# Patient Record
Sex: FEMALE | Race: WHITE | NOT HISPANIC OR LATINO | Employment: OTHER | URBAN - METROPOLITAN AREA
[De-identification: names, ages, dates, MRNs, and addresses within clinical notes are randomized per-mention and may not be internally consistent; named-entity substitution may affect disease eponyms.]

---

## 2017-01-05 ASSESSMENT — MIFFLIN-ST. JEOR: SCORE: 1681.57

## 2017-01-06 ENCOUNTER — SURGERY - HEALTHEAST (OUTPATIENT)
Dept: SURGERY | Facility: HOSPITAL | Age: 52
End: 2017-01-06

## 2017-01-06 ENCOUNTER — ANESTHESIA - HEALTHEAST (OUTPATIENT)
Dept: SURGERY | Facility: HOSPITAL | Age: 52
End: 2017-01-06

## 2021-05-30 VITALS — BODY MASS INDEX: 39.99 KG/M2 | HEIGHT: 65 IN | WEIGHT: 240 LBS

## 2021-06-08 NOTE — ANESTHESIA PREPROCEDURE EVALUATION
"Anesthesia Evaluation      Patient summary reviewed     Airway   Mallampati: II  Neck ROM: full   Pulmonary - negative ROS    breath sounds clear to auscultation                         Cardiovascular   Exercise tolerance: > or = 4 METS  (+) hypertension, CAD, , hypercholesterolemia,     Rhythm: regular        Neuro/Psych    (+) CVA ,     Endo/Other    (+) obesity,      GI/Hepatic/Renal    (+) GERD,        Other findings: Hx of MI right after delivering child. ECHO done in 2012 with [facility] was \"normal\" according to patient and . Seen by cardiology within the last month and cleared.     Hx of multiple DVTs and CVAs - dysarthria and some distal motor deficits in LUE. No sensory deficits per patient.    Ambulates without assistance devices.    Last plavix was in November 2016.            Dental      Comment: Temporary crown on tooth #30                       Anesthesia Plan  Planned anesthetic: spinal and MAC  SAB with GETA discussed with patient and family and they agree to the plan.  ASA 3       Anesthesia plan special considerations: antiemetics, IV therapy two IVs,   Post-op plan: routine recovery            Salma Allen MD  Staff Anesthesiologist  [facility]  [facility]  1/6/17          "

## 2021-06-08 NOTE — ANESTHESIA CARE TRANSFER NOTE
Last vitals:   Vitals:    01/06/17 1350   BP: 116/78   Pulse: 84   Resp: 24   Temp: 36.6  C (97.8  F)   SpO2: 98%     Patient's level of consciousness is drowsy  Spontaneous respirations: yes  Maintains airway independently: yes  Dentition unchanged: yes  Oropharynx: oropharynx clear of all foreign objects    QCDR Measures:  ASA# 20 - Surgical Safety Checklist: ASA20A - Safety Checks Done  PQRS# 430 - Adult PONV Prevention: 4558F - Pt received => 2 anti-emetic agents (different classes) preop & intraop  ASA# 8 - Peds PONV Prevention: NA - Not pediatric patient, not GA or 2 or more risk factors NOT present  PQRS# 424 - Madiha-op Temp Management: 4559F - At least one body temp DOCUMENTED => 35.5C or 95.9F within required timeframe  PQRS# 426 - PACU Transfer Protocol: - Transfer of care checklist used  ASA# 14 - Acute Post-op Pain: ASA14B - Patient did NOT experience pain >= 7 out of 10    I completed my SBAR handoff to the receiving nurse per policy and procedure.

## 2021-06-08 NOTE — ANESTHESIA POSTPROCEDURE EVALUATION
Patient: Radha Snyder  VAGINAL HYSTERECTOMY, ANTERIOR REPAIR  Anesthesia type: spinal    Patient location: PACU  Last vitals:   Vitals:    01/06/17 1430   BP: 144/81   Pulse: 69   Resp: 17   Temp: 36.4  C (97.5  F)   SpO2: 97%     Post vital signs: stable  Level of consciousness: awake and responds to simple questions  Post-anesthesia pain: pain controlled  Post-anesthesia nausea and vomiting: no  Pulmonary: unassisted, return to baseline  Cardiovascular: stable and blood pressure at baseline  Hydration: adequate  Anesthetic events: no    QCDR Measures:  ASA# 11 - Madiha-op Cardiac Arrest: ASA11B - Patient did NOT experience unanticipated cardiac arrest  ASA# 12 - Madiha-op Mortality Rate: ASA12B - Patient did NOT die  ASA# 13 - PACU Re-Intubation Rate: ASA13B - Patient did NOT require a new airway mgmt  ASA# 10 - Composite Anes Safety: ASA10A - No serious adverse event  ASA# 38 - New Corneal Injury: ASA38A - No new exposure keratitis or corneal abrasion in PACU    Additional Notes:

## 2021-06-08 NOTE — ANESTHESIA PROCEDURE NOTES
Spinal Block    Patient location during procedure: OR  Start time: 1/6/2017 12:01 PM  End time: 1/6/2017 12:07 PM  Reason for block: at surgeon's request and primary anesthetic    Staffing:  Performing  Anesthesiologist: SALMA ALLEN    Preanesthetic Checklist  Completed: patient identified, risks, benefits, and alternatives discussed, timeout performed, consent obtained, airway assessed, oxygen available, suction available, emergency drugs available and hand hygiene performed  Spinal Block  Patient position: sitting  Prep: ChloraPrep  Patient monitoring: heart rate, continuous pulse ox and blood pressure  Approach: midline  Location: L2-3  Injection technique: single-shot  Needle type: pencil-tip   Needle gauge: 24 G      Additional Notes:    SAB with marcaine 0.75% 1.8 cc.    Salma Allen MD  Staff Anesthesiologist  [facility]  [facility]  1/6/17

## 2023-02-07 ENCOUNTER — HOSPITAL ENCOUNTER (OUTPATIENT)
Facility: CLINIC | Age: 58
Discharge: HOME | End: 2023-02-07
Attending: COLON & RECTAL SURGERY | Admitting: COLON & RECTAL SURGERY
Payer: COMMERCIAL

## 2023-02-07 ENCOUNTER — ANESTHESIA EVENT (OUTPATIENT)
Dept: SURGERY | Facility: CLINIC | Age: 58
DRG: 330 | End: 2023-02-07
Payer: COMMERCIAL

## 2023-02-07 VITALS
SYSTOLIC BLOOD PRESSURE: 113 MMHG | RESPIRATION RATE: 16 BRPM | DIASTOLIC BLOOD PRESSURE: 88 MMHG | OXYGEN SATURATION: 100 % | HEIGHT: 65 IN | BODY MASS INDEX: 39.99 KG/M2 | WEIGHT: 240 LBS | HEART RATE: 60 BPM

## 2023-02-07 LAB — COLONOSCOPY: NORMAL

## 2023-02-07 PROCEDURE — 99153 MOD SED SAME PHYS/QHP EA: CPT | Mod: PT | Performed by: COLON & RECTAL SURGERY

## 2023-02-07 PROCEDURE — 250N000011 HC RX IP 250 OP 636: Performed by: COLON & RECTAL SURGERY

## 2023-02-07 PROCEDURE — 999N000127 HC STATISTIC PERIPHERAL IV START W US GUIDANCE

## 2023-02-07 PROCEDURE — 88305 TISSUE EXAM BY PATHOLOGIST: CPT | Mod: TC | Performed by: COLON & RECTAL SURGERY

## 2023-02-07 PROCEDURE — G0500 MOD SEDAT ENDO SERVICE >5YRS: HCPCS | Mod: PT | Performed by: COLON & RECTAL SURGERY

## 2023-02-07 PROCEDURE — 88305 TISSUE EXAM BY PATHOLOGIST: CPT | Mod: 26 | Performed by: PATHOLOGY

## 2023-02-07 PROCEDURE — 45380 COLONOSCOPY AND BIOPSY: CPT | Performed by: COLON & RECTAL SURGERY

## 2023-02-07 RX ORDER — PROCHLORPERAZINE MALEATE 10 MG
10 TABLET ORAL EVERY 6 HOURS PRN
Status: DISCONTINUED | OUTPATIENT
Start: 2023-02-07 | End: 2023-02-07 | Stop reason: HOSPADM

## 2023-02-07 RX ORDER — CEFAZOLIN SODIUM 2 G/100ML
2 INJECTION, SOLUTION INTRAVENOUS SEE ADMIN INSTRUCTIONS
Status: CANCELLED | OUTPATIENT
Start: 2023-02-07

## 2023-02-07 RX ORDER — LIDOCAINE 40 MG/G
CREAM TOPICAL
Status: DISCONTINUED | OUTPATIENT
Start: 2023-02-07 | End: 2023-02-07 | Stop reason: HOSPADM

## 2023-02-07 RX ORDER — ACETAMINOPHEN 325 MG/1
975 TABLET ORAL ONCE
Status: CANCELLED | OUTPATIENT
Start: 2023-02-07 | End: 2023-02-07

## 2023-02-07 RX ORDER — METRONIDAZOLE 500 MG/1
500 TABLET ORAL SEE ADMIN INSTRUCTIONS
Status: ON HOLD | COMMUNITY
End: 2023-02-09

## 2023-02-07 RX ORDER — FLUMAZENIL 0.1 MG/ML
0.2 INJECTION, SOLUTION INTRAVENOUS
Status: DISCONTINUED | OUTPATIENT
Start: 2023-02-07 | End: 2023-02-07 | Stop reason: HOSPADM

## 2023-02-07 RX ORDER — NALOXONE HYDROCHLORIDE 0.4 MG/ML
0.4 INJECTION, SOLUTION INTRAMUSCULAR; INTRAVENOUS; SUBCUTANEOUS
Status: DISCONTINUED | OUTPATIENT
Start: 2023-02-07 | End: 2023-02-07 | Stop reason: HOSPADM

## 2023-02-07 RX ORDER — ATROPINE SULFATE 0.1 MG/ML
1 INJECTION INTRAVENOUS
Status: DISCONTINUED | OUTPATIENT
Start: 2023-02-07 | End: 2023-02-07 | Stop reason: HOSPADM

## 2023-02-07 RX ORDER — CEFAZOLIN SODIUM 2 G/100ML
2 INJECTION, SOLUTION INTRAVENOUS
Status: CANCELLED | OUTPATIENT
Start: 2023-02-07

## 2023-02-07 RX ORDER — ONDANSETRON 4 MG/1
4 TABLET, ORALLY DISINTEGRATING ORAL EVERY 6 HOURS PRN
Status: DISCONTINUED | OUTPATIENT
Start: 2023-02-07 | End: 2023-02-07 | Stop reason: HOSPADM

## 2023-02-07 RX ORDER — FENTANYL CITRATE 50 UG/ML
50-100 INJECTION, SOLUTION INTRAMUSCULAR; INTRAVENOUS EVERY 5 MIN PRN
Status: DISCONTINUED | OUTPATIENT
Start: 2023-02-07 | End: 2023-02-07 | Stop reason: HOSPADM

## 2023-02-07 RX ORDER — METRONIDAZOLE 500 MG/100ML
500 INJECTION, SOLUTION INTRAVENOUS
Status: CANCELLED | OUTPATIENT
Start: 2023-02-07

## 2023-02-07 RX ORDER — DIPHENHYDRAMINE HYDROCHLORIDE 50 MG/ML
25-50 INJECTION INTRAMUSCULAR; INTRAVENOUS
Status: DISCONTINUED | OUTPATIENT
Start: 2023-02-07 | End: 2023-02-07 | Stop reason: HOSPADM

## 2023-02-07 RX ORDER — ONDANSETRON 2 MG/ML
4 INJECTION INTRAMUSCULAR; INTRAVENOUS
Status: DISCONTINUED | OUTPATIENT
Start: 2023-02-07 | End: 2023-02-07 | Stop reason: HOSPADM

## 2023-02-07 RX ORDER — NALOXONE HYDROCHLORIDE 0.4 MG/ML
0.2 INJECTION, SOLUTION INTRAMUSCULAR; INTRAVENOUS; SUBCUTANEOUS
Status: DISCONTINUED | OUTPATIENT
Start: 2023-02-07 | End: 2023-02-07 | Stop reason: HOSPADM

## 2023-02-07 RX ORDER — SIMETHICONE 40MG/0.6ML
133 SUSPENSION, DROPS(FINAL DOSAGE FORM)(ML) ORAL
Status: DISCONTINUED | OUTPATIENT
Start: 2023-02-07 | End: 2023-02-07 | Stop reason: HOSPADM

## 2023-02-07 RX ORDER — ONDANSETRON 2 MG/ML
4 INJECTION INTRAMUSCULAR; INTRAVENOUS ONCE
Status: CANCELLED | OUTPATIENT
Start: 2023-02-07 | End: 2023-02-07

## 2023-02-07 RX ORDER — EPINEPHRINE 1 MG/ML
0.1 INJECTION, SOLUTION INTRAMUSCULAR; SUBCUTANEOUS
Status: DISCONTINUED | OUTPATIENT
Start: 2023-02-07 | End: 2023-02-07 | Stop reason: HOSPADM

## 2023-02-07 RX ORDER — ONDANSETRON 2 MG/ML
4 INJECTION INTRAMUSCULAR; INTRAVENOUS EVERY 6 HOURS PRN
Status: DISCONTINUED | OUTPATIENT
Start: 2023-02-07 | End: 2023-02-07 | Stop reason: HOSPADM

## 2023-02-07 RX ADMIN — MIDAZOLAM 2 MG: 1 INJECTION INTRAMUSCULAR; INTRAVENOUS at 11:09

## 2023-02-07 RX ADMIN — FENTANYL CITRATE 100 MCG: 50 INJECTION, SOLUTION INTRAMUSCULAR; INTRAVENOUS at 11:09

## 2023-02-07 ASSESSMENT — ACTIVITIES OF DAILY LIVING (ADL)
ADLS_ACUITY_SCORE: 35
ADLS_ACUITY_SCORE: 35

## 2023-02-07 NOTE — H&P
Pre-Endoscopy History and Physical     Rahda Snyder MRN# [id number]   YOB: 1965 Age: 57 year old     Date of Procedure: 2/7/2023  Primary care provider: [facility]  Type of Endoscopy: Colonoscopy  Reason for Procedure: H/O ulcerative colitis  Type of Anesthesia Anticipated: Moderate Sedation    HPI:    Radha is a 57 year old female who will be undergoing the above procedure.      A history and physical has been performed. The patient's medications and allergies have been reviewed. The risks and benefits of the procedure and the sedation options and risks were discussed with the patient.  All questions were answered and informed consent was obtained.      She denies a personal or family history of anesthesia complications or bleeding disorders.     Allergies   Allergen Reactions     Anaprox [Naproxen] Hives     Aspartame Nausea and Vomiting and Headache     Any artificial sweetener     Prednisone Other (See Comments)     Drastic weight gain     Sulfa (Sulfonamide Antibiotics) [Sulfa Drugs] Hives     Vicodin [Hydrocodone-Acetaminophen] Nausea     Severe nausea        No current facility-administered medications on file prior to encounter.  aspirin 81 MG EC tablet, [ASPIRIN 81 MG EC TABLET] Take 162 mg by mouth daily.   aspirin-acetaminophen-caffeine (EXCEDRIN MIGRAINE) 250-250-65 mg per tablet, [ASPIRIN-ACETAMINOPHEN-CAFFEINE (EXCEDRIN MIGRAINE) 250-250-65 MG PER TABLET] Take 1 tablet by mouth every 6 (six) hours as needed for pain.  atorvastatin (LIPITOR) 80 MG tablet, [ATORVASTATIN (LIPITOR) 80 MG TABLET] Take 80 mg by mouth bedtime.  clopidogrel (PLAVIX) 75 mg tablet, [CLOPIDOGREL (PLAVIX) 75 MG TABLET] Take 75 mg by mouth daily.  lisinopril (PRINIVIL,ZESTRIL) 10 MG tablet, [LISINOPRIL (PRINIVIL,ZESTRIL) 10 MG TABLET] Take 15 mg by mouth daily.  mesalamine (ASACOL HD) 800 mg TbEC EC tablet, [MESALAMINE (ASACOL HD) 800 MG TBEC EC TABLET] Take 800 mg by mouth 2 (two)  times a day.  butalbital-acetaminop-caf-cod -53-30 mg cap, [BUTALBITAL-ACETAMINOP-CAF-COD -79-30 MG CAP] Take 1 capsule by mouth every 6 (six) hours as needed.   cinnamon bark 500 mg capsule, [CINNAMON BARK 500 MG CAPSULE] Take 500 mg by mouth daily.  ginkgo biloba 40 mg Tab, [GINKGO BILOBA 40 MG TAB] Take 40 mg by mouth daily.   glucosamine-chondroitin 500-400 mg cap, [GLUCOSAMINE-CHONDROITIN 500-400 MG CAP] Take 1 capsule by mouth 3 (three) times a day.  VITAMIN B COMPLEX (B COMPLEX ORAL), [VITAMIN B COMPLEX (B COMPLEX ORAL)] Take 1 tablet by mouth daily.         There is no problem list on file for this patient.       Past Medical History:   Diagnosis Date     Allergic rhinitis      Cerebral infarction due to thrombosis of cerebral artery (H)      Gastroesophageal reflux disease      HLD (hyperlipidemia)      Hypoxemia      Incomplete RBBB      Migraine      Morbid obesity (H)      Old myocardial infarction      Ulcerative colitis (H)         Past Surgical History:   Procedure Laterality Date     BLADDER SUSPENSION       COLONOSCOPY       CYSTOSCOPY N/A 2016    Procedure: CYSTOSCOPY;  Surgeon: Andres Sampson MD;  Location: [facility];  Service:      HC SLING OPERATION FOR STRESS INCONTINENCE N/A 2016    Procedure: MIDURETHRAL SLING (SPARC);  Surgeon: Andres Sampson MD;  Location: [facility];  Service: Urology     HYSTERECTOMY  2017    Anterior Repair     HYSTERECTOMY VAGINAL N/A 2017    Procedure: VAGINAL HYSTERECTOMY, ANTERIOR REPAIR;  Surgeon: Ivan Aceves MD;  Location: [facility] OR;  Service:        Social History     Tobacco Use     Smoking status: Former     Types: Cigarettes     Quit date: 1999     Years since quittin.1     Smokeless tobacco: Never   Substance Use Topics     Alcohol use: Yes     Comment: Alcoholic Drinks/day: rare       Family History   Problem Relation Age of Onset     Colon Cancer No family hx of   "      REVIEW OF SYSTEMS:     5 point ROS negative except as noted above in HPI, including Gen., Resp., CV, GI &  system review.      PHYSICAL EXAM:   BP 99/73   Pulse 60   Resp 17   Ht 1.651 m (5' 5\")   Wt 108.9 kg (240 lb)   SpO2 98%   BMI 39.94 kg/m   Estimated body mass index is 39.94 kg/m  as calculated from the following:    Height as of this encounter: 1.651 m (5' 5\").    Weight as of this encounter: 108.9 kg (240 lb).   GENERAL APPEARANCE: healthy and alert  MENTAL STATUS: alert  AIRWAY EXAM: Mallampatti Class I (visualization of the soft palate, fauces, uvula, anterior and posterior pillars)  RESP: lungs clear to auscultation - no rales, rhonchi or wheezes  CV: regular rates and rhythm      IMPRESSION   ASA Class 2 - Mild systemic disease        PLAN:     Plan for colonoscopy. We discussed the risks, benefits and alternatives and the patient wished to proceed.    The above has been forwarded to the consulting provider.      Meliza Hawk MD  [facility]  Phone: [phone]  Fax: [phone]  February 7, 2023    "

## 2023-02-07 NOTE — PROGRESS NOTES
PTA medications updated by Medication Scribe prior to surgery via phone call with patient (last doses completed by Nurse)     Medication history sources: Patient and H&P  In the past week, patient estimated taking medication this percent of the time: Greater than 90%  Adherence assessment: N/A Not Observed    Significant changes made to the medication list:  Patient reports no longer taking the following meds (med scribe removed from PTA med list): Cyclobenzaprine, Claritin-D, Hydrocortisone   Changed lisinopril 15 mg daily--> 10 mg daily    Additional medication history information:   None    Medication reconciliation completed by provider prior to medication history? No    Time spent in this activity: 35 minutes    Prior to Admission medications    Medication Sig Last Dose Taking? Auth Provider Long Term End Date   aspirin 81 MG EC tablet Take 81 mg by mouth daily 1/31/2023 at am Yes Provider, Historical     aspirin-acetaminophen-caffeine (EXCEDRIN MIGRAINE) 250-250-65 mg per tablet [ASPIRIN-ACETAMINOPHEN-CAFFEINE (EXCEDRIN MIGRAINE) 250-250-65 MG PER TABLET] Take 1 tablet by mouth every 6 (six) hours as needed for pain. Unknown at prn Yes Provider, Historical     atorvastatin (LIPITOR) 80 MG tablet [ATORVASTATIN (LIPITOR) 80 MG TABLET] Take 80 mg by mouth bedtime. 2/7/2023 at pm Yes Provider, Historical     butalbital-acetaminop-caf-cod -21-30 mg cap [BUTALBITAL-ACETAMINOP-CAF-COD -90-30 MG CAP] Take 1 capsule by mouth every 6 (six) hours as needed.  1/31/2023 at prn Yes Provider, Historical     cinnamon bark 500 mg capsule [CINNAMON BARK 500 MG CAPSULE] Take 500 mg by mouth daily. 1/31/2023 at am Yes Provider, Historical     clopidogrel (PLAVIX) 75 mg tablet [CLOPIDOGREL (PLAVIX) 75 MG TABLET] Take 75 mg by mouth daily. 1/31/2023 at pm Yes Provider, Historical     ginkgo biloba 40 mg Tab [GINKGO BILOBA 40 MG TAB] Take 40 mg by mouth daily.  1/31/2023 at am Yes Provider, Historical      glucosamine-chondroitin 500-400 mg cap [GLUCOSAMINE-CHONDROITIN 500-400 MG CAP] Take 1 capsule by mouth 3 (three) times a day. 1/31/2023 at am Yes Provider, Historical     lisinopril (PRINIVIL,ZESTRIL) 10 MG tablet Take 10 mg by mouth daily 2/1/2023 at am Yes Provider, Historical     mesalamine (LIALDA) 1.2 g DR tablet Take 4 tablets by mouth daily 2/7/2023 at am Yes Provider, Historical     VITAMIN B COMPLEX (B COMPLEX ORAL) [VITAMIN B COMPLEX (B COMPLEX ORAL)] Take 1 tablet by mouth daily.  1/31/2023 at am Yes Provider, Historical     VITAMIN D PO Take 1 tablet by mouth daily 1/31/2023 at am Yes Reported, Patient     metroNIDAZOLE (FLAGYL) 500 MG tablet Take 500 mg by mouth See Admin Instructions Three times day before surgery; at 1pm, 2pm, and 11pm 2/7/2023 at 2300  Reported, Patient         The information provided in this note is only as accurate as the sources available at the time of update(s)     Medication history completed by:    Jose Carlos Smith CPhT  Medication [facility]

## 2023-02-08 ENCOUNTER — HOSPITAL ENCOUNTER (INPATIENT)
Facility: CLINIC | Age: 58
LOS: 2 days | Discharge: HOME | DRG: 330 | End: 2023-02-10
Attending: COLON & RECTAL SURGERY | Admitting: COLON & RECTAL SURGERY
Payer: COMMERCIAL

## 2023-02-08 ENCOUNTER — ANESTHESIA (OUTPATIENT)
Dept: SURGERY | Facility: CLINIC | Age: 58
DRG: 330 | End: 2023-02-08
Payer: COMMERCIAL

## 2023-02-08 DIAGNOSIS — K62.3 RECTAL PROLAPSE: Primary | ICD-10-CM

## 2023-02-08 LAB
ABO/RH(D): NORMAL
ANTIBODY SCREEN: NEGATIVE
CREAT SERPL-MCNC: 0.66 MG/DL (ref 0.51–0.95)
GFR SERPL CREATININE-BSD FRML MDRD: >90 ML/MIN/1.73M2
HGB BLD-MCNC: 12.6 G/DL (ref 11.7–15.7)
PATH REPORT.COMMENTS IMP SPEC: NORMAL
PATH REPORT.FINAL DX SPEC: NORMAL
PATH REPORT.GROSS SPEC: NORMAL
PATH REPORT.MICROSCOPIC SPEC OTHER STN: NORMAL
PATH REPORT.RELEVANT HX SPEC: NORMAL
PHOTO IMAGE: NORMAL
POTASSIUM SERPL-SCNC: 3.4 MMOL/L (ref 3.4–5.3)
SPECIMEN EXPIRATION DATE: NORMAL

## 2023-02-08 PROCEDURE — 250N000009 HC RX 250: Performed by: NURSE ANESTHETIST, CERTIFIED REGISTERED

## 2023-02-08 PROCEDURE — 999N000141 HC STATISTIC PRE-PROCEDURE NURSING ASSESSMENT: Performed by: COLON & RECTAL SURGERY

## 2023-02-08 PROCEDURE — 250N000011 HC RX IP 250 OP 636: Performed by: COLON & RECTAL SURGERY

## 2023-02-08 PROCEDURE — 86850 RBC ANTIBODY SCREEN: CPT | Performed by: COLON & RECTAL SURGERY

## 2023-02-08 PROCEDURE — 250N000013 HC RX MED GY IP 250 OP 250 PS 637: Performed by: COLON & RECTAL SURGERY

## 2023-02-08 PROCEDURE — 85018 HEMOGLOBIN: CPT | Performed by: COLON & RECTAL SURGERY

## 2023-02-08 PROCEDURE — 84132 ASSAY OF SERUM POTASSIUM: CPT | Performed by: ANESTHESIOLOGY

## 2023-02-08 PROCEDURE — 258N000003 HC RX IP 258 OP 636: Performed by: ANESTHESIOLOGY

## 2023-02-08 PROCEDURE — 250N000025 HC SEVOFLURANE, PER MIN: Performed by: COLON & RECTAL SURGERY

## 2023-02-08 PROCEDURE — 258N000003 HC RX IP 258 OP 636: Performed by: COLON & RECTAL SURGERY

## 2023-02-08 PROCEDURE — 36415 COLL VENOUS BLD VENIPUNCTURE: CPT | Performed by: ANESTHESIOLOGY

## 2023-02-08 PROCEDURE — 99221 1ST HOSP IP/OBS SF/LOW 40: CPT | Performed by: PHYSICIAN ASSISTANT

## 2023-02-08 PROCEDURE — 258N000001 HC RX 258: Performed by: COLON & RECTAL SURGERY

## 2023-02-08 PROCEDURE — 370N000017 HC ANESTHESIA TECHNICAL FEE, PER MIN: Performed by: COLON & RECTAL SURGERY

## 2023-02-08 PROCEDURE — 710N000009 HC RECOVERY PHASE 1, LEVEL 1, PER MIN: Performed by: COLON & RECTAL SURGERY

## 2023-02-08 PROCEDURE — 250N000011 HC RX IP 250 OP 636: Performed by: NURSE ANESTHETIST, CERTIFIED REGISTERED

## 2023-02-08 PROCEDURE — 258N000003 HC RX IP 258 OP 636: Performed by: NURSE ANESTHETIST, CERTIFIED REGISTERED

## 2023-02-08 PROCEDURE — 86901 BLOOD TYPING SEROLOGIC RH(D): CPT | Performed by: COLON & RECTAL SURGERY

## 2023-02-08 PROCEDURE — 250N000009 HC RX 250: Performed by: COLON & RECTAL SURGERY

## 2023-02-08 PROCEDURE — 120N000001 HC R&B MED SURG/OB

## 2023-02-08 PROCEDURE — 0DSP4ZZ REPOSITION RECTUM, PERCUTANEOUS ENDOSCOPIC APPROACH: ICD-10-PCS | Performed by: COLON & RECTAL SURGERY

## 2023-02-08 PROCEDURE — 250N000011 HC RX IP 250 OP 636: Performed by: ANESTHESIOLOGY

## 2023-02-08 PROCEDURE — 360N000080 HC SURGERY LEVEL 7, PER MIN: Performed by: COLON & RECTAL SURGERY

## 2023-02-08 PROCEDURE — 272N000001 HC OR GENERAL SUPPLY STERILE: Performed by: COLON & RECTAL SURGERY

## 2023-02-08 PROCEDURE — 8E0W4CZ ROBOTIC ASSISTED PROCEDURE OF TRUNK REGION, PERCUTANEOUS ENDOSCOPIC APPROACH: ICD-10-PCS | Performed by: COLON & RECTAL SURGERY

## 2023-02-08 PROCEDURE — 82565 ASSAY OF CREATININE: CPT | Performed by: COLON & RECTAL SURGERY

## 2023-02-08 RX ORDER — SODIUM CHLORIDE, SODIUM LACTATE, POTASSIUM CHLORIDE, CALCIUM CHLORIDE 600; 310; 30; 20 MG/100ML; MG/100ML; MG/100ML; MG/100ML
INJECTION, SOLUTION INTRAVENOUS CONTINUOUS
Status: DISCONTINUED | OUTPATIENT
Start: 2023-02-08 | End: 2023-02-10 | Stop reason: HOSPADM

## 2023-02-08 RX ORDER — ONDANSETRON 2 MG/ML
INJECTION INTRAMUSCULAR; INTRAVENOUS PRN
Status: DISCONTINUED | OUTPATIENT
Start: 2023-02-08 | End: 2023-02-08

## 2023-02-08 RX ORDER — CEFAZOLIN SODIUM/WATER 2 G/20 ML
2 SYRINGE (ML) INTRAVENOUS SEE ADMIN INSTRUCTIONS
Status: DISCONTINUED | OUTPATIENT
Start: 2023-02-08 | End: 2023-02-08 | Stop reason: HOSPADM

## 2023-02-08 RX ORDER — HYDROMORPHONE HCL IN WATER/PF 6 MG/30 ML
0.4 PATIENT CONTROLLED ANALGESIA SYRINGE INTRAVENOUS
Status: DISCONTINUED | OUTPATIENT
Start: 2023-02-08 | End: 2023-02-10 | Stop reason: HOSPADM

## 2023-02-08 RX ORDER — MESALAMINE 1.2 G/1
4.8 TABLET, DELAYED RELEASE ORAL DAILY
Status: DISCONTINUED | OUTPATIENT
Start: 2023-02-09 | End: 2023-02-10 | Stop reason: HOSPADM

## 2023-02-08 RX ORDER — NALOXONE HYDROCHLORIDE 0.4 MG/ML
0.2 INJECTION, SOLUTION INTRAMUSCULAR; INTRAVENOUS; SUBCUTANEOUS
Status: DISCONTINUED | OUTPATIENT
Start: 2023-02-08 | End: 2023-02-10 | Stop reason: HOSPADM

## 2023-02-08 RX ORDER — FENTANYL CITRATE 50 UG/ML
INJECTION, SOLUTION INTRAMUSCULAR; INTRAVENOUS PRN
Status: DISCONTINUED | OUTPATIENT
Start: 2023-02-08 | End: 2023-02-08

## 2023-02-08 RX ORDER — FENTANYL CITRATE 0.05 MG/ML
50 INJECTION, SOLUTION INTRAMUSCULAR; INTRAVENOUS EVERY 5 MIN PRN
Status: DISCONTINUED | OUTPATIENT
Start: 2023-02-08 | End: 2023-02-08 | Stop reason: HOSPADM

## 2023-02-08 RX ORDER — NALOXONE HYDROCHLORIDE 0.4 MG/ML
0.4 INJECTION, SOLUTION INTRAMUSCULAR; INTRAVENOUS; SUBCUTANEOUS
Status: DISCONTINUED | OUTPATIENT
Start: 2023-02-08 | End: 2023-02-10 | Stop reason: HOSPADM

## 2023-02-08 RX ORDER — LISINOPRIL 10 MG/1
10 TABLET ORAL DAILY
Status: DISCONTINUED | OUTPATIENT
Start: 2023-02-09 | End: 2023-02-10 | Stop reason: HOSPADM

## 2023-02-08 RX ORDER — ACETAMINOPHEN 325 MG/1
650 TABLET ORAL EVERY 4 HOURS PRN
Status: DISCONTINUED | OUTPATIENT
Start: 2023-02-11 | End: 2023-02-10 | Stop reason: HOSPADM

## 2023-02-08 RX ORDER — OXYCODONE HYDROCHLORIDE 5 MG/1
10 TABLET ORAL EVERY 4 HOURS PRN
Status: DISCONTINUED | OUTPATIENT
Start: 2023-02-08 | End: 2023-02-08 | Stop reason: HOSPADM

## 2023-02-08 RX ORDER — SODIUM CHLORIDE, SODIUM LACTATE, POTASSIUM CHLORIDE, CALCIUM CHLORIDE 600; 310; 30; 20 MG/100ML; MG/100ML; MG/100ML; MG/100ML
INJECTION, SOLUTION INTRAVENOUS CONTINUOUS
Status: DISCONTINUED | OUTPATIENT
Start: 2023-02-08 | End: 2023-02-08 | Stop reason: HOSPADM

## 2023-02-08 RX ORDER — ONDANSETRON 2 MG/ML
4 INJECTION INTRAMUSCULAR; INTRAVENOUS EVERY 6 HOURS PRN
Status: DISCONTINUED | OUTPATIENT
Start: 2023-02-09 | End: 2023-02-10 | Stop reason: HOSPADM

## 2023-02-08 RX ORDER — HYDROMORPHONE HCL IN WATER/PF 6 MG/30 ML
0.2 PATIENT CONTROLLED ANALGESIA SYRINGE INTRAVENOUS EVERY 5 MIN PRN
Status: DISCONTINUED | OUTPATIENT
Start: 2023-02-08 | End: 2023-02-08 | Stop reason: HOSPADM

## 2023-02-08 RX ORDER — METRONIDAZOLE 500 MG/100ML
500 INJECTION, SOLUTION INTRAVENOUS EVERY 12 HOURS
Status: COMPLETED | OUTPATIENT
Start: 2023-02-08 | End: 2023-02-09

## 2023-02-08 RX ORDER — OXYCODONE HYDROCHLORIDE 5 MG/1
10 TABLET ORAL EVERY 4 HOURS PRN
Status: DISCONTINUED | OUTPATIENT
Start: 2023-02-08 | End: 2023-02-10 | Stop reason: HOSPADM

## 2023-02-08 RX ORDER — ONDANSETRON 2 MG/ML
4 INJECTION INTRAMUSCULAR; INTRAVENOUS EVERY 30 MIN PRN
Status: DISCONTINUED | OUTPATIENT
Start: 2023-02-08 | End: 2023-02-08 | Stop reason: HOSPADM

## 2023-02-08 RX ORDER — FENTANYL CITRATE 0.05 MG/ML
25 INJECTION, SOLUTION INTRAMUSCULAR; INTRAVENOUS EVERY 5 MIN PRN
Status: DISCONTINUED | OUTPATIENT
Start: 2023-02-08 | End: 2023-02-08 | Stop reason: HOSPADM

## 2023-02-08 RX ORDER — OXYCODONE HYDROCHLORIDE 5 MG/1
5 TABLET ORAL EVERY 4 HOURS PRN
Status: DISCONTINUED | OUTPATIENT
Start: 2023-02-08 | End: 2023-02-08 | Stop reason: HOSPADM

## 2023-02-08 RX ORDER — LIDOCAINE HYDROCHLORIDE 20 MG/ML
INJECTION, SOLUTION INFILTRATION; PERINEURAL PRN
Status: DISCONTINUED | OUTPATIENT
Start: 2023-02-08 | End: 2023-02-08

## 2023-02-08 RX ORDER — PROPOFOL 10 MG/ML
INJECTION, EMULSION INTRAVENOUS PRN
Status: DISCONTINUED | OUTPATIENT
Start: 2023-02-08 | End: 2023-02-08

## 2023-02-08 RX ORDER — HYDROMORPHONE HCL IN WATER/PF 6 MG/30 ML
0.4 PATIENT CONTROLLED ANALGESIA SYRINGE INTRAVENOUS EVERY 5 MIN PRN
Status: DISCONTINUED | OUTPATIENT
Start: 2023-02-08 | End: 2023-02-08 | Stop reason: HOSPADM

## 2023-02-08 RX ORDER — DEXAMETHASONE SODIUM PHOSPHATE 4 MG/ML
INJECTION, SOLUTION INTRA-ARTICULAR; INTRALESIONAL; INTRAMUSCULAR; INTRAVENOUS; SOFT TISSUE PRN
Status: DISCONTINUED | OUTPATIENT
Start: 2023-02-08 | End: 2023-02-08

## 2023-02-08 RX ORDER — PROCHLORPERAZINE MALEATE 10 MG
10 TABLET ORAL EVERY 6 HOURS PRN
Status: DISCONTINUED | OUTPATIENT
Start: 2023-02-08 | End: 2023-02-10 | Stop reason: HOSPADM

## 2023-02-08 RX ORDER — ONDANSETRON 4 MG/1
4 TABLET, ORALLY DISINTEGRATING ORAL EVERY 6 HOURS PRN
Status: DISCONTINUED | OUTPATIENT
Start: 2023-02-09 | End: 2023-02-10 | Stop reason: HOSPADM

## 2023-02-08 RX ORDER — ONDANSETRON 2 MG/ML
4 INJECTION INTRAMUSCULAR; INTRAVENOUS ONCE
Status: DISCONTINUED | OUTPATIENT
Start: 2023-02-08 | End: 2023-02-08 | Stop reason: HOSPADM

## 2023-02-08 RX ORDER — HYDROMORPHONE HCL IN WATER/PF 6 MG/30 ML
0.2 PATIENT CONTROLLED ANALGESIA SYRINGE INTRAVENOUS
Status: DISCONTINUED | OUTPATIENT
Start: 2023-02-08 | End: 2023-02-10 | Stop reason: HOSPADM

## 2023-02-08 RX ORDER — METRONIDAZOLE 500 MG/100ML
500 INJECTION, SOLUTION INTRAVENOUS
Status: COMPLETED | OUTPATIENT
Start: 2023-02-08 | End: 2023-02-08

## 2023-02-08 RX ORDER — BUTALBITAL, ACETAMINOPHEN, CAFFEINE AND CODEINE PHOSPHATE 50; 325; 40; 30 MG/1; MG/1; MG/1; MG/1
1 CAPSULE ORAL EVERY 6 HOURS PRN
Status: DISCONTINUED | OUTPATIENT
Start: 2023-02-08 | End: 2023-02-10 | Stop reason: HOSPADM

## 2023-02-08 RX ORDER — LIDOCAINE 40 MG/G
CREAM TOPICAL
Status: DISCONTINUED | OUTPATIENT
Start: 2023-02-08 | End: 2023-02-10 | Stop reason: HOSPADM

## 2023-02-08 RX ORDER — CEFAZOLIN SODIUM/WATER 2 G/20 ML
2 SYRINGE (ML) INTRAVENOUS
Status: COMPLETED | OUTPATIENT
Start: 2023-02-08 | End: 2023-02-08

## 2023-02-08 RX ORDER — MEPERIDINE HYDROCHLORIDE 25 MG/ML
12.5 INJECTION INTRAMUSCULAR; INTRAVENOUS; SUBCUTANEOUS EVERY 5 MIN PRN
Status: DISCONTINUED | OUTPATIENT
Start: 2023-02-08 | End: 2023-02-08 | Stop reason: HOSPADM

## 2023-02-08 RX ORDER — ACETAMINOPHEN 325 MG/1
975 TABLET ORAL EVERY 8 HOURS
Status: DISCONTINUED | OUTPATIENT
Start: 2023-02-08 | End: 2023-02-10 | Stop reason: HOSPADM

## 2023-02-08 RX ORDER — ATORVASTATIN CALCIUM 40 MG/1
80 TABLET, FILM COATED ORAL AT BEDTIME
Status: DISCONTINUED | OUTPATIENT
Start: 2023-02-08 | End: 2023-02-10 | Stop reason: HOSPADM

## 2023-02-08 RX ORDER — CEFAZOLIN SODIUM 1 G/3ML
1 INJECTION, POWDER, FOR SOLUTION INTRAMUSCULAR; INTRAVENOUS EVERY 8 HOURS
Status: COMPLETED | OUTPATIENT
Start: 2023-02-08 | End: 2023-02-09

## 2023-02-08 RX ORDER — PROPOFOL 10 MG/ML
INJECTION, EMULSION INTRAVENOUS CONTINUOUS PRN
Status: DISCONTINUED | OUTPATIENT
Start: 2023-02-08 | End: 2023-02-08

## 2023-02-08 RX ORDER — ACETAMINOPHEN 325 MG/1
975 TABLET ORAL ONCE
Status: COMPLETED | OUTPATIENT
Start: 2023-02-08 | End: 2023-02-08

## 2023-02-08 RX ORDER — BUPIVACAINE HYDROCHLORIDE 5 MG/ML
INJECTION, SOLUTION PERINEURAL PRN
Status: DISCONTINUED | OUTPATIENT
Start: 2023-02-08 | End: 2023-02-08 | Stop reason: HOSPADM

## 2023-02-08 RX ORDER — OXYCODONE HYDROCHLORIDE 5 MG/1
5 TABLET ORAL EVERY 4 HOURS PRN
Status: DISCONTINUED | OUTPATIENT
Start: 2023-02-08 | End: 2023-02-10 | Stop reason: HOSPADM

## 2023-02-08 RX ORDER — ONDANSETRON 4 MG/1
4 TABLET, ORALLY DISINTEGRATING ORAL EVERY 30 MIN PRN
Status: DISCONTINUED | OUTPATIENT
Start: 2023-02-08 | End: 2023-02-08 | Stop reason: HOSPADM

## 2023-02-08 RX ORDER — MAGNESIUM HYDROXIDE 1200 MG/15ML
LIQUID ORAL PRN
Status: DISCONTINUED | OUTPATIENT
Start: 2023-02-08 | End: 2023-02-08 | Stop reason: HOSPADM

## 2023-02-08 RX ADMIN — PROPOFOL 30 MCG/KG/MIN: 10 INJECTION, EMULSION INTRAVENOUS at 13:32

## 2023-02-08 RX ADMIN — PROCHLORPERAZINE EDISYLATE 10 MG: 5 INJECTION INTRAMUSCULAR; INTRAVENOUS at 22:24

## 2023-02-08 RX ADMIN — ACETAMINOPHEN 975 MG: 325 TABLET, FILM COATED ORAL at 10:54

## 2023-02-08 RX ADMIN — SUGAMMADEX 200 MG: 100 INJECTION, SOLUTION INTRAVENOUS at 17:05

## 2023-02-08 RX ADMIN — ONDANSETRON 4 MG: 2 INJECTION INTRAMUSCULAR; INTRAVENOUS at 13:25

## 2023-02-08 RX ADMIN — FENTANYL CITRATE 50 MCG: 50 INJECTION, SOLUTION INTRAMUSCULAR; INTRAVENOUS at 15:56

## 2023-02-08 RX ADMIN — HYDROMORPHONE HYDROCHLORIDE 0.4 MG: 0.2 INJECTION, SOLUTION INTRAMUSCULAR; INTRAVENOUS; SUBCUTANEOUS at 20:22

## 2023-02-08 RX ADMIN — CEFAZOLIN 1 G: 1 INJECTION, POWDER, FOR SOLUTION INTRAMUSCULAR; INTRAVENOUS at 20:22

## 2023-02-08 RX ADMIN — ROCURONIUM BROMIDE 10 MG: 50 INJECTION, SOLUTION INTRAVENOUS at 15:38

## 2023-02-08 RX ADMIN — SODIUM CHLORIDE, POTASSIUM CHLORIDE, SODIUM LACTATE AND CALCIUM CHLORIDE: 600; 310; 30; 20 INJECTION, SOLUTION INTRAVENOUS at 10:55

## 2023-02-08 RX ADMIN — ACETAMINOPHEN 975 MG: 325 TABLET, FILM COATED ORAL at 22:24

## 2023-02-08 RX ADMIN — SODIUM CHLORIDE, POTASSIUM CHLORIDE, SODIUM LACTATE AND CALCIUM CHLORIDE: 600; 310; 30; 20 INJECTION, SOLUTION INTRAVENOUS at 19:52

## 2023-02-08 RX ADMIN — SODIUM CHLORIDE, POTASSIUM CHLORIDE, SODIUM LACTATE AND CALCIUM CHLORIDE: 600; 310; 30; 20 INJECTION, SOLUTION INTRAVENOUS at 14:17

## 2023-02-08 RX ADMIN — SUCCINYLCHOLINE CHLORIDE 140 MG: 20 INJECTION, SOLUTION INTRAMUSCULAR; INTRAVENOUS; PARENTERAL at 13:32

## 2023-02-08 RX ADMIN — ROCURONIUM BROMIDE 50 MG: 50 INJECTION, SOLUTION INTRAVENOUS at 13:49

## 2023-02-08 RX ADMIN — FENTANYL CITRATE 50 MCG: 50 INJECTION, SOLUTION INTRAMUSCULAR; INTRAVENOUS at 15:34

## 2023-02-08 RX ADMIN — FENTANYL CITRATE 50 MCG: 50 INJECTION, SOLUTION INTRAMUSCULAR; INTRAVENOUS at 17:40

## 2023-02-08 RX ADMIN — ROCURONIUM BROMIDE 10 MG: 50 INJECTION, SOLUTION INTRAVENOUS at 16:09

## 2023-02-08 RX ADMIN — FENTANYL CITRATE 100 MCG: 50 INJECTION, SOLUTION INTRAMUSCULAR; INTRAVENOUS at 13:32

## 2023-02-08 RX ADMIN — METRONIDAZOLE 500 MG: 500 INJECTION, SOLUTION INTRAVENOUS at 22:25

## 2023-02-08 RX ADMIN — ROCURONIUM BROMIDE 10 MG: 50 INJECTION, SOLUTION INTRAVENOUS at 15:04

## 2023-02-08 RX ADMIN — LIDOCAINE HYDROCHLORIDE 100 MG: 20 INJECTION, SOLUTION INFILTRATION; PERINEURAL at 13:32

## 2023-02-08 RX ADMIN — METRONIDAZOLE 500 MG: 500 INJECTION, SOLUTION INTRAVENOUS at 10:56

## 2023-02-08 RX ADMIN — PROPOFOL 200 MG: 10 INJECTION, EMULSION INTRAVENOUS at 13:32

## 2023-02-08 RX ADMIN — ROCURONIUM BROMIDE 10 MG: 50 INJECTION, SOLUTION INTRAVENOUS at 14:28

## 2023-02-08 RX ADMIN — PHENYLEPHRINE HYDROCHLORIDE 0.3 MCG/KG/MIN: 10 INJECTION INTRAVENOUS at 14:05

## 2023-02-08 RX ADMIN — ONDANSETRON 4 MG: 2 INJECTION INTRAMUSCULAR; INTRAVENOUS at 19:06

## 2023-02-08 RX ADMIN — HYDROMORPHONE HYDROCHLORIDE 0.4 MG: 0.2 INJECTION, SOLUTION INTRAMUSCULAR; INTRAVENOUS; SUBCUTANEOUS at 19:05

## 2023-02-08 RX ADMIN — MIDAZOLAM 2 MG: 1 INJECTION INTRAMUSCULAR; INTRAVENOUS at 13:18

## 2023-02-08 RX ADMIN — Medication 2 G: at 13:21

## 2023-02-08 RX ADMIN — FENTANYL CITRATE 50 MCG: 50 INJECTION, SOLUTION INTRAMUSCULAR; INTRAVENOUS at 17:33

## 2023-02-08 RX ADMIN — DEXAMETHASONE SODIUM PHOSPHATE 4 MG: 4 INJECTION, SOLUTION INTRA-ARTICULAR; INTRALESIONAL; INTRAMUSCULAR; INTRAVENOUS; SOFT TISSUE at 13:25

## 2023-02-08 RX ADMIN — ATORVASTATIN CALCIUM 80 MG: 40 TABLET, FILM COATED ORAL at 22:24

## 2023-02-08 RX ADMIN — PROPOFOL 20 MCG/KG/MIN: 10 INJECTION, EMULSION INTRAVENOUS at 15:26

## 2023-02-08 RX ADMIN — HYDROMORPHONE HYDROCHLORIDE 0.4 MG: 0.2 INJECTION, SOLUTION INTRAMUSCULAR; INTRAVENOUS; SUBCUTANEOUS at 18:05

## 2023-02-08 RX ADMIN — HYDROMORPHONE HYDROCHLORIDE 0.4 MG: 0.2 INJECTION, SOLUTION INTRAMUSCULAR; INTRAVENOUS; SUBCUTANEOUS at 18:38

## 2023-02-08 ASSESSMENT — ENCOUNTER SYMPTOMS
DYSRHYTHMIAS: 1
SEIZURES: 0

## 2023-02-08 ASSESSMENT — ACTIVITIES OF DAILY LIVING (ADL)
WEAR_GLASSES_OR_BLIND: NO
ADLS_ACUITY_SCORE: 18
DRESSING/BATHING_DIFFICULTY: NO
CONCENTRATING,_REMEMBERING_OR_MAKING_DECISIONS_DIFFICULTY: NO
CHANGE_IN_FUNCTIONAL_STATUS_SINCE_ONSET_OF_CURRENT_ILLNESS/INJURY: NO
ADLS_ACUITY_SCORE: 35
ADLS_ACUITY_SCORE: 18
ADLS_ACUITY_SCORE: 18
FALL_HISTORY_WITHIN_LAST_SIX_MONTHS: NO
DIFFICULTY_COMMUNICATING: NO
TOILETING_ISSUES: NO
HEARING_DIFFICULTY_OR_DEAF: NO
ADLS_ACUITY_SCORE: 18
ADLS_ACUITY_SCORE: 18
ADLS_ACUITY_SCORE: 22
DIFFICULTY_EATING/SWALLOWING: NO
DOING_ERRANDS_INDEPENDENTLY_DIFFICULTY: NO
WALKING_OR_CLIMBING_STAIRS_DIFFICULTY: NO

## 2023-02-08 ASSESSMENT — LIFESTYLE VARIABLES: TOBACCO_USE: 0

## 2023-02-08 NOTE — ANESTHESIA CARE TRANSFER NOTE
Patient: Radha Snyder    Procedure: Procedure(s):  ROBOTIC SUTURE RECTOPEXY       Diagnosis: Rectal prolapse [K62.3]  Mild chronic ulcerative colitis with complication (H) [K51.919]  Diagnosis Additional Information: No value filed.    Anesthesia Type:   General     Note:    Oropharynx: oropharynx clear of all foreign objects  Level of Consciousness: awake  Oxygen Supplementation: face mask  Level of Supplemental Oxygen (L/min / FiO2): 6    Dentition: dentition unchanged  Vital Signs Stable: post-procedure vital signs reviewed and stable  Report to RN Given: handoff report given  Patient transferred to: PACU    Handoff Report: Identifed the Patient, Identified the Reponsible Provider, Reviewed the pertinent medical history, Discussed the surgical course, Reviewed Intra-OP anesthesia mangement and issues during anesthesia, Set expectations for post-procedure period and Allowed opportunity for questions and acknowledgement of understanding      Vitals:  Vitals Value Taken Time   /80 02/08/23 1715   Temp     Pulse 87 02/08/23 1720   Resp 10 02/08/23 1720   SpO2 96 % 02/08/23 1720   Vitals shown include unvalidated device data.    Electronically Signed By: JILLIAN Ni CRNA  February 8, 2023  5:21 PM

## 2023-02-08 NOTE — ANESTHESIA PREPROCEDURE EVALUATION
Anesthesia Pre-Procedure Evaluation    Patient: Radha Snyder   MRN: [id number] : 1965        Procedure : Procedure(s):  ROBOTIC SUTURE RECTOPEXY          Past Medical History:   Diagnosis Date     Allergic rhinitis      Cerebral infarction due to thrombosis of cerebral artery (H)      Gastroesophageal reflux disease      HLD (hyperlipidemia)      Hypoxemia      Incomplete RBBB      Migraine      Morbid obesity (H)      Old myocardial infarction      Ulcerative colitis (H)       Past Surgical History:   Procedure Laterality Date     BLADDER SUSPENSION       COLONOSCOPY       COLONOSCOPY N/A 2023    Procedure: COLONOSCOPY, WITH biopsies;  Surgeon: Maria Antonia Hawk MD;  Location:  GI     CYSTOSCOPY N/A 2016    Procedure: CYSTOSCOPY;  Surgeon: Andres Sampson MD;  Location: [facility];  Service:      HC SLING OPERATION FOR STRESS INCONTINENCE N/A 2016    Procedure: MIDURETHRAL SLING (SPARC);  Surgeon: Andres Sampson MD;  Location: [facility];  Service: Urology     HYSTERECTOMY  2017    Anterior Repair     HYSTERECTOMY VAGINAL N/A 2017    Procedure: VAGINAL HYSTERECTOMY, ANTERIOR REPAIR;  Surgeon: Ivan Aceves MD;  Location: [facility] OR;  Service:       Allergies   Allergen Reactions     Anaprox [Naproxen] Hives     Aspartame Nausea and Vomiting and Headache     Any artificial sweetener     Prednisone Other (See Comments)     Drastic weight gain     Sulfa (Sulfonamide Antibiotics) [Sulfa Drugs] Hives     Vicodin [Hydrocodone-Acetaminophen] Nausea     Severe nausea      Social History     Tobacco Use     Smoking status: Former     Types: Cigarettes     Quit date: 1999     Years since quittin.1     Smokeless tobacco: Never   Substance Use Topics     Alcohol use: Yes     Comment: Alcoholic Drinks/day: rare      Wt Readings from Last 1 Encounters:   23 108.2 kg (238 lb 9.6 oz)        Anesthesia Evaluation   Pt has had prior  anesthetic.     No history of anesthetic complications       ROS/MED HX  ENT/Pulmonary:     (+) allergic rhinitis,  (-) tobacco use, asthma and sleep apnea   Neurologic:     (+) migraines, CVA (patient states distant history, resolved, related to migraines which have been under control.  no residual),  (-) no seizures   Cardiovascular:     (+) --CAD -past MI (post partum period 2010) --dysrhythmias, Other,     METS/Exercise Tolerance:     Hematologic:       Musculoskeletal:       GI/Hepatic: Comment: Ulcerative colitis    (+) GERD, Asymptomatic on medication,     Renal/Genitourinary:       Endo:     (+) Obesity,     Psychiatric/Substance Use:       Infectious Disease:       Malignancy:       Other:            Physical Exam    Airway        Mallampati: II   TM distance: > 3 FB   Neck ROM: full   Mouth opening: > 3 cm    Respiratory Devices and Support         Dental       (+) Completely normal teeth      Cardiovascular   cardiovascular exam normal          Pulmonary   pulmonary exam normal                OUTSIDE LABS:  CBC:   Lab Results   Component Value Date    HGB 12.6 02/08/2023     BMP:   Lab Results   Component Value Date    POTASSIUM 3.4 02/08/2023     COAGS: No results found for: PTT, INR, FIBR  POC: No results found for: BGM, HCG, HCGS  HEPATIC: No results found for: ALBUMIN, PROTTOTAL, ALT, AST, GGT, ALKPHOS, BILITOTAL, BILIDIRECT, KIAH  OTHER: No results found for: PH, LACT, A1C, FLAVIO, PHOS, MAG, LIPASE, AMYLASE, TSH, T4, T3, CRP, SED    Anesthesia Plan    ASA Status:  2      Anesthesia Type: General.     - Airway: ETT   Induction: Intravenous.   Maintenance: Balanced.   Techniques and Equipment:     - Airway: Video-Laryngoscope         Consents    Anesthesia Plan(s) and associated risks, benefits, and realistic alternatives discussed. Questions answered and patient/representative(s) expressed understanding.    - Discussed:     - Discussed with:  Patient         Postoperative Care    Pain management: IV  analgesics, Oral pain medications.   PONV prophylaxis: Ondansetron (or other 5HT-3), Background Propofol Infusion     Comments:    Other Comments: Maintain normatension            Ana Lilia Bowden

## 2023-02-08 NOTE — OP NOTE
DATE OF SURGERY: 2/8/2023     PREOPERATIVE DIAGNOSIS:   Ulcerative colitis  Rectal prolapse     POSTOPERATIVE DIAGNOSIS: Same     OPERATION PERFORMED:   Robotic posterior suture rectopexy  Rigid proctoscopy        SURGEON: Meliza Hawk MD      ASSISTANT:   Jamie Serrano PA-C          ANESTHESIA: General.      INDICATIONS: Radha Snyder is a 57-year-old female with a history of ulcerative colitis who presented to the pelvic floor center several months prior for evaluation of full-thickness rectal prolapse.  She underwent full pelvic floor testing including defecography which showed full-thickness rectal prolapse.  Patient has pelvic floor weakness and some incontinence related to her prolapse.  She had a colonoscopy the day prior to her surgery which showed mild focal active colitis.  She has been maintained on mesalamine and is no flare of her symptoms with this.  She does follow with Dr. Lindo at [facility] and is up-to-date on all of her surveillance with regards to her ulcerative colitis.  The risks and benefits of this procedure were discussed with the patient.  She will now undergo a robotic posterior suture rectopexy.      OPERATIVE FINDINGS: The rectum appeared grossly normal, but there was significant laxity in the peritoneum overlying the rectum distally.  At the conclusion of the rectopexy a rectal exam confirmed that the prolapse was reduced with good support to the rectum internally.  Rigid proctoscopy was performed at the conclusion of the case and confirmed that there was no injury to the rectal wall.  There is also no evidence of any full-thickness sutures.     PROCEDURE IN DETAIL: After informed consent was obtained, the patient was brought to the operating room and placed in the supine position on the operating room table. Sequential compression devices were placed on the lower extremities prior to induction and general anesthesia was induced without difficulty. A Briones catheter was inserted. She  was placed into the well-padded dorsal lithotomy position and IV antibiotics were administered. The rectum was not prolapsed. A Pigasi pad was used on the operating room table to prevent her from sliding off the table in steep Trendelenburg position. Her abdomen was sterilely prepped and draped in the usual fashion.      We began by making a stab incision in the left upper quadrant of the abdomen.  A Veress needle was used to establish pneumoperitoneum.  A small incision was made in the supraumbilical position.  An 8 mm robotic trocar was placed here without difficulty.  We then surveyed the abdomen with 30 degree angled robotic camera.  There is no evidence of injury to the underlying viscera and the Veress needle was removed.  The abdomen was surveyed and there is no evidence of other pathology.  We then performed a laparoscopic-assisted TAP block utilizing 30 mL of half percent Marcaine plain.  This was injected in four aliquots; one for each quadrant of the abdomen.    A 1.2 cm vertical supraumbilical incision was made in the skin using a #15 blade. An 8 -mm bladeless trocar was inserted into the abdominal cavity through the supraumbilical camera port incision.  Four additional bladeless trocars were inserted into the abdominal cavity in the following locations.      1. An 8 mm robotic port placed in the mid clavicular line on the right side of the abdomen 10 cm lateral and just inferior to the camera port.   2. An 8 mm robotic port was placed in the mid clavicular line on the left side of the abdomen at the same level as robotic port #1.   3. Robotic port #3 was placed 4 cm above the left superior iliac crest and 8 cm lateral from robotic port #2 on the left side of the abdomen.    4. A  5-mm Airseal assistant port was placed inferior and lateral to robotic port #1 on the right side of the abdomen.      The patient was placed in steep Trendelenburg position and the small bowel was gently swept out of the pelvis  using a blunt grasper. The robot was then docked in the standard fashion. The 3 robotic instruments were advanced into the abdomen under direct visualization.  A tip up grasper was used in arm #1 for retraction, a fenestrated bipolar grasper was used and #2.  The camera is in arm #3.  And a cautery EndoShears was placed in arm #4.  The suction  and an atraumatic grasper were used through the assistant port. The sigmoid colon was elevated toward the anterior abdominal wall using an atraumatic grasper, exposing the inferior mesenteric artery. The uterus was retracted towards the anterior abdominal wall using an EEA sizer in the vagina. A window was created in the colonic mesentery. The distal and inferior to the inferior mesenteric artery using cautery Endoshears from robotic arm #1 and dissection proceeded from medial to lateral behind the inferior mesenteric vessels. The left and right ureters were clearly identified and preserved. Mobilization continued from medial to lateral dissecting behind the superior hemorrhoidal vessels and sweeping the retroperitoneal tissue inferiorly. The superior hemorrhoidal vessels were preserved and were not divided during the operation. The presacral space was entered.      The rectum was mobilized in the avascular plane posteriorly outside of the fascia propria of the mesorectum down to the levator muscles of the pelvic floor. Dissection continued through Waldeyer's fascia down to the anal canal. The rectum was mobilized laterally on both sides by incising the peritoneum down to the lateral stalks, but the lateral stalk was partially divided on the right side and was not divided on the left side. The anterior peritoneal reflection was  opened. Once the rectum had been completely mobilized down to the pelvic floor, it could easily be elevated in preparation for the rectopexy.       We upsized the right lower quadrant port to a 12 mm port in order to introduce the 2-0 Prolene  sutures into the abdomen.  The rectum was pulled superiorly using the double fenestrated grasper and a suture rectopexy was then performed using three interrupted 2-0 Prolene sutures placed from the mesorectum and the peritoneum to the presacral fascia beginning 5 to 6 cm below the level of the sacral promontory (level of S2) and extending proximally (level of S4). All the sutures were placed along the medial longitudinal ligament and the strength of the bite was confirmed with traction applied with laparoscopic grasper. The rectum had good adherence to the sacrum and the most proximal suture was placed several centimeters below the level of the sacral promontory. The abdomen was irrigated with a copious amount of saline solution including the pelvis and sucked dry. Hemostasis was adequate.  A digital rectal exam was performed to verify that the rectum as completely reduced and had good support.  There was an area in the distal rectal dissection in the rectovaginal septum I placed 3-0 Vicryl Lembert sutures as we are very close to the rectal wall.  Prior to conclusion of the case we undocked the robot.  The patient was then placed in slight reverse Trendelenburg.  The rectum was then occluded proximally and rigid proctoscopy was performed and confirmed that there was no injury to the rectum and there was no air leak intra-abdominally.  The air was evacuated through the proctoscope.  We also confirmed that there was no recurrence of the prolapse with the patient in a reverse Trendelenburg position.     Prior to removing all the ports from the abdominal cavity we did closed the right lower quadrant 12 mm port with 0 Vicryl figure-of-eight suture using the Kyle-Valencia device.  All ports were then removed from the abdominal cavity and the CO2 gas was completely allowed to escape.  The incisions were irrigated with saline solution, and the skin of all the port sites were closed using buried interrupted subdermal 4-0  Monocryl sutures. Dermabond was applied to the incisions.      The patient tolerated the procedure well without complications. Estimated blood loss was 20 mL. I was present and/or scrubbed for the entire duration of the operation.      Meliza Hawk MD

## 2023-02-08 NOTE — BRIEF OP NOTE
[facility]    Brief Operative Note    Pre-operative diagnosis: Rectal prolapse [K62.3]  Mild chronic ulcerative colitis with complication (H) [K51.919]  Post-operative diagnosis Same as pre-operative diagnosis    Procedure: Procedure(s):  ROBOTIC SUTURE RECTOPEXY  Surgeon: Surgeon(s) and Role:     * Maria Antonia Hawk MD - Primary     * Jamie Serrano PA-C - Assisting  Anesthesia: General   Estimated Blood Loss: 20 mL    Drains: None  Specimens: * No specimens in log *  Findings:   None.  Complications: None.  Implants: * No implants in log *    Condition on discharge from OR: Satisfactory    Jamie Serrano PA-C   [facility]   [phone].        ADDENDUM:    PATIENT DATA  Indicate Y or N:  Home O2 No  Hemodialysis  No  Transplant patient  No  Cirrhosis  No  Steroids in last 30 days  No  Immunomodulators in last 30 days  No  Anticoagulation at time of surgery  No   List medication N/A  Prior abdominal surgery  No  Pelvic irradiation  No    Albumin within 30 days if known None   Hgb within 30 days if known 12.6   Hemoglobin   Date Value Ref Range Status   02/08/2023 12.6 11.7 - 15.7 g/dL Final   ]  Cr within 30 days if known None  Body mass index is 39.71 kg/m .      OR DATA  Emergent  No   <24 hours  No   <1 week  No  Bowel Prep Yes  Antibiotics  Yes  DVT prophylaxis    Heparin  Yes   SCD  Yes   None  No  Drain  No  ASA (1,2,3,4) 2  OR time (min) 184  Stents  No  Transfuse >/= 2U  No  Anastomosis   Stapled  No   Handsewn  No  Leak Test    Positive  No   Negative  Yes   Not done  No

## 2023-02-09 LAB
ANION GAP SERPL CALCULATED.3IONS-SCNC: 8 MMOL/L (ref 7–15)
BUN SERPL-MCNC: 3.8 MG/DL (ref 6–20)
CALCIUM SERPL-MCNC: 8.4 MG/DL (ref 8.6–10)
CHLORIDE SERPL-SCNC: 109 MMOL/L (ref 98–107)
CREAT SERPL-MCNC: 0.64 MG/DL (ref 0.51–0.95)
DEPRECATED HCO3 PLAS-SCNC: 24 MMOL/L (ref 22–29)
ERYTHROCYTE [DISTWIDTH] IN BLOOD BY AUTOMATED COUNT: 12.9 % (ref 10–15)
GFR SERPL CREATININE-BSD FRML MDRD: >90 ML/MIN/1.73M2
GLUCOSE BLDC GLUCOMTR-MCNC: 112 MG/DL (ref 70–99)
GLUCOSE SERPL-MCNC: 111 MG/DL (ref 70–99)
HCT VFR BLD AUTO: 32.7 % (ref 35–47)
HGB BLD-MCNC: 10.6 G/DL (ref 11.7–15.7)
MAGNESIUM SERPL-MCNC: 1.8 MG/DL (ref 1.7–2.3)
MCH RBC QN AUTO: 29.2 PG (ref 26.5–33)
MCHC RBC AUTO-ENTMCNC: 32.4 G/DL (ref 31.5–36.5)
MCV RBC AUTO: 90 FL (ref 78–100)
PHOSPHATE SERPL-MCNC: 3.4 MG/DL (ref 2.5–4.5)
PLATELET # BLD AUTO: 184 10E3/UL (ref 150–450)
POTASSIUM SERPL-SCNC: 3.6 MMOL/L (ref 3.4–5.3)
RBC # BLD AUTO: 3.63 10E6/UL (ref 3.8–5.2)
SODIUM SERPL-SCNC: 141 MMOL/L (ref 136–145)
WBC # BLD AUTO: 10.8 10E3/UL (ref 4–11)

## 2023-02-09 PROCEDURE — 120N000001 HC R&B MED SURG/OB

## 2023-02-09 PROCEDURE — 250N000013 HC RX MED GY IP 250 OP 250 PS 637: Performed by: COLON & RECTAL SURGERY

## 2023-02-09 PROCEDURE — 83735 ASSAY OF MAGNESIUM: CPT | Performed by: COLON & RECTAL SURGERY

## 2023-02-09 PROCEDURE — 258N000003 HC RX IP 258 OP 636: Performed by: COLON & RECTAL SURGERY

## 2023-02-09 PROCEDURE — 99207 PR NO CHARGE LOS: CPT | Performed by: HOSPITALIST

## 2023-02-09 PROCEDURE — 250N000013 HC RX MED GY IP 250 OP 250 PS 637: Performed by: PHYSICIAN ASSISTANT

## 2023-02-09 PROCEDURE — 36415 COLL VENOUS BLD VENIPUNCTURE: CPT | Performed by: COLON & RECTAL SURGERY

## 2023-02-09 PROCEDURE — 85027 COMPLETE CBC AUTOMATED: CPT | Performed by: COLON & RECTAL SURGERY

## 2023-02-09 PROCEDURE — 250N000011 HC RX IP 250 OP 636: Performed by: COLON & RECTAL SURGERY

## 2023-02-09 PROCEDURE — 84100 ASSAY OF PHOSPHORUS: CPT | Performed by: COLON & RECTAL SURGERY

## 2023-02-09 PROCEDURE — 80048 BASIC METABOLIC PNL TOTAL CA: CPT | Performed by: COLON & RECTAL SURGERY

## 2023-02-09 RX ORDER — CALCIUM CARBONATE 500 MG/1
500-1000 TABLET, CHEWABLE ORAL 2 TIMES DAILY PRN
Status: DISCONTINUED | OUTPATIENT
Start: 2023-02-09 | End: 2023-02-10 | Stop reason: HOSPADM

## 2023-02-09 RX ADMIN — CEFAZOLIN 1 G: 1 INJECTION, POWDER, FOR SOLUTION INTRAMUSCULAR; INTRAVENOUS at 05:33

## 2023-02-09 RX ADMIN — CEFAZOLIN 1 G: 1 INJECTION, POWDER, FOR SOLUTION INTRAMUSCULAR; INTRAVENOUS at 13:40

## 2023-02-09 RX ADMIN — MESALAMINE 4.8 G: 1.2 TABLET, DELAYED RELEASE ORAL at 08:19

## 2023-02-09 RX ADMIN — ACETAMINOPHEN 975 MG: 325 TABLET, FILM COATED ORAL at 05:33

## 2023-02-09 RX ADMIN — LISINOPRIL 10 MG: 10 TABLET ORAL at 08:19

## 2023-02-09 RX ADMIN — CALCIUM CARBONATE (ANTACID) CHEW TAB 500 MG 500 MG: 500 CHEW TAB at 20:54

## 2023-02-09 RX ADMIN — ATORVASTATIN CALCIUM 80 MG: 40 TABLET, FILM COATED ORAL at 21:00

## 2023-02-09 RX ADMIN — OXYCODONE HYDROCHLORIDE 5 MG: 5 TABLET ORAL at 17:45

## 2023-02-09 RX ADMIN — ACETAMINOPHEN 975 MG: 325 TABLET, FILM COATED ORAL at 21:00

## 2023-02-09 RX ADMIN — OXYCODONE HYDROCHLORIDE 5 MG: 5 TABLET ORAL at 10:13

## 2023-02-09 RX ADMIN — ACETAMINOPHEN 975 MG: 325 TABLET, FILM COATED ORAL at 13:40

## 2023-02-09 RX ADMIN — SODIUM CHLORIDE, POTASSIUM CHLORIDE, SODIUM LACTATE AND CALCIUM CHLORIDE: 600; 310; 30; 20 INJECTION, SOLUTION INTRAVENOUS at 04:52

## 2023-02-09 RX ADMIN — METRONIDAZOLE 500 MG: 500 INJECTION, SOLUTION INTRAVENOUS at 09:55

## 2023-02-09 ASSESSMENT — ACTIVITIES OF DAILY LIVING (ADL)
ADLS_ACUITY_SCORE: 22

## 2023-02-09 NOTE — CONSULTS
[facility]  Consult Note - Hospitalist Service  Date of Admission:  2/8/2023    Assessment & Plan   Radha Snyder is a 57 year old female with bilateral CVAs of uncertain cause (last documented CVA 2015) on DAPT, post-partum MI 2003, GERD, ulcerative colitis on mesalamine, HTN, DLD, migraines, and rectal prolapse who was admitted on 2/8/2023 for an elective robotic suture rectopexy with Dr Hawk.  Hospitalist consulted for post-op medical management.     S/p robotic suture rectopexy, POD#0  Mgmt as per CRS.  Plavix on hold, resume as per CRS.     Migraine headaches  Patient reports high concern regarding lack of food stating this can trigger migraine.  She also notes that she cannot have any artificial sugars as this will also trigger migraine.  -Avoid potential triggers  -PTA excedrin migraine and Butalbital-APAP-caff-cod reordered PRN migraine     HTN  - PTA lisinopril     Ulcerative Colitis  - PTA Mesalamine resumed by CRS     Recurrent ischemic CVAs of uncertain etiology  History of bilateral MCA strokes.  Last stroke in 7/2015 blamed on ibuprofen and obesity.  Etiology unclear.  Patient reports these are triggered by migraines, however, this is not supported by records.  Patient has undergone extensive work up across several hospital systems - including [place] and [facility] - without obvious cause.  Most recent note from Neurologist Dr Jose Alfredo Garces ([facility]) from 2015 describes consider investigation into potential hypercoagulable disorder, vasculitis, and genetic mutation without obvious cause for strokes identified.  Weight optimization recommended and patient is being treated with indefinite aspirin, Plavix, and high-dose atorvastatin   - Resume PTA atorvastatin  - Resume PTA Plavix when cleared by CRS    Nocturnal hypoxemia known diagnosis, HORACE suspected   - not on CPAP  - Monitor    Paola Stapleton PAC  Hospitalist  Service  ______________________________________________________________________    History of Present Illness   Radha Snyder is a 57 year old female with bilateral CVAs of uncertain cause (last documented CVA 2015) on DAPT, post-partum MI 2003, GERD, ulcerative colitis on mesalamine, HTN, DLD, migraines, and rectal prolapse who was admitted on 2/8/2023 for an elective robotic suture rectopexy with Dr Hawk.  Hospitalist consulted for post-op medical management.       Patient reports that she was in her usual state health leading up to surgery.  Denies any chest pain, shortness breath, abdominal complaints, unusual bruising or bleeding.  She is very concerned that she is not eating anything today because this can trigger migraines.    Review of Systems   A comprehensive 14 point review of systems was undertaken with pertinent positives and negatives as above and otherwise unremarkable.     Past Medical History    I have reviewed this patient's medical history and updated it with pertinent information if needed.   Past Medical History:   Diagnosis Date     Allergic rhinitis      Cerebral infarction due to thrombosis of cerebral artery (H)      Gastroesophageal reflux disease      HLD (hyperlipidemia)      Hypoxemia      Incomplete RBBB      Migraine      Morbid obesity (H)      Old myocardial infarction      Ulcerative colitis (H)      History of bilateral MCA strokes.  Last stroke in 7/2015 blamed on ibuprofen and obesity.  Etiology unclear.  Patient reports these are triggered by migraines, however, this is not supported by records.  Patient has undergone extensive work up across several hospital systems - including [place] and [facility] - without obvious cause.  Most recent note from Neurologist Dr Jose Alfredo Garces ([facility]) from 2015 describes consider investigation into potential hypercoagulable disorder, vasculitis, and genetic mutation without obvious cause for strokes identified.  Weight  optimization recommended and patient is being treated with indefinite aspirin, Plavix, and high-dose atorvastatin     Nocturnal hypoxemia known diagnosis, HORACE suspected. Not on CPAP    Past Surgical History   I have reviewed this patient's surgical history and updated it with pertinent information if needed.  Past Surgical History:   Procedure Laterality Date     BLADDER SUSPENSION       COLONOSCOPY       COLONOSCOPY N/A 2023    Procedure: COLONOSCOPY, WITH biopsies;  Surgeon: Maria Antonia Hawk MD;  Location:  GI     CYSTOSCOPY N/A 2016    Procedure: CYSTOSCOPY;  Surgeon: Andres Sampson MD;  Location: [facility];  Service:      HC SLING OPERATION FOR STRESS INCONTINENCE N/A 2016    Procedure: MIDURETHRAL SLING (SPARC);  Surgeon: Andres Sampson MD;  Location: [facility];  Service: Urology     HYSTERECTOMY  2017    Anterior Repair     HYSTERECTOMY VAGINAL N/A 2017    Procedure: VAGINAL HYSTERECTOMY, ANTERIOR REPAIR;  Surgeon: Ivan Aceves MD;  Location: [facility];  Service:        Social History   I have reviewed this patient's social history and updated it with pertinent information if needed.  Social History     Tobacco Use     Smoking status: Former     Types: Cigarettes     Quit date: 1999     Years since quittin.1     Smokeless tobacco: Never   Vaping Use     Vaping Use: Never used   Substance Use Topics     Alcohol use: Yes     Comment: Alcoholic Drinks/day: rare     Drug use: No         Family History   I have reviewed this patient's family history and updated it with pertinent information if needed.   Family History   Problem Relation Age of Onset     Colon Cancer No family hx of    Father had heart attack, high blood pressure, disc edema, migraine, stroke. Mother had breast cancer.    Medications   Medications Prior to Admission   Medication Sig Dispense Refill Last Dose     aspirin 81 MG EC tablet Take 81 mg by mouth daily    1/31/2023 at am     aspirin-acetaminophen-caffeine (EXCEDRIN MIGRAINE) 250-250-65 mg per tablet [ASPIRIN-ACETAMINOPHEN-CAFFEINE (EXCEDRIN MIGRAINE) 250-250-65 MG PER TABLET] Take 1 tablet by mouth every 6 (six) hours as needed for pain.   Unknown at prn     atorvastatin (LIPITOR) 80 MG tablet [ATORVASTATIN (LIPITOR) 80 MG TABLET] Take 80 mg by mouth bedtime.   2/7/2023 at pm     butalbital-acetaminop-caf-cod -60-30 mg cap [BUTALBITAL-ACETAMINOP-CAF-COD -86-30 MG CAP] Take 1 capsule by mouth every 6 (six) hours as needed.    1/31/2023 at prn     cinnamon bark 500 mg capsule [CINNAMON BARK 500 MG CAPSULE] Take 500 mg by mouth daily.   1/31/2023 at am     clopidogrel (PLAVIX) 75 mg tablet [CLOPIDOGREL (PLAVIX) 75 MG TABLET] Take 75 mg by mouth daily.   1/31/2023 at pm     ginkgo biloba 40 mg Tab [GINKGO BILOBA 40 MG TAB] Take 40 mg by mouth daily.    1/31/2023 at am     glucosamine-chondroitin 500-400 mg cap [GLUCOSAMINE-CHONDROITIN 500-400 MG CAP] Take 1 capsule by mouth 3 (three) times a day.   1/31/2023 at am     lisinopril (PRINIVIL,ZESTRIL) 10 MG tablet Take 10 mg by mouth daily   2/1/2023 at am     mesalamine (LIALDA) 1.2 g DR tablet Take 4 tablets by mouth daily   2/7/2023 at am     VITAMIN B COMPLEX (B COMPLEX ORAL) [VITAMIN B COMPLEX (B COMPLEX ORAL)] Take 1 tablet by mouth daily.    1/31/2023 at am     VITAMIN D PO Take 1 tablet by mouth daily   1/31/2023 at am     metroNIDAZOLE (FLAGYL) 500 MG tablet Take 500 mg by mouth See Admin Instructions Three times day before surgery; at 1pm, 2pm, and 11pm   2/7/2023 at 2300       Allergies   Allergies   Allergen Reactions     Anaprox [Naproxen] Hives     Aspartame Nausea and Vomiting and Headache     Any artificial sweetener     Prednisone Other (See Comments)     Drastic weight gain     Sulfa (Sulfonamide Antibiotics) [Sulfa Drugs] Hives     Vicodin [Hydrocodone-Acetaminophen] Nausea     Severe nausea       Physical Exam   Vital Signs: Temp: 97.6  F  (36.4  C) Temp src: Oral BP: 120/66 Pulse: 70   Resp: (!) 8 SpO2: 92 % O2 Device: Nasal cannula Oxygen Delivery: 2 LPM  Weight: 238 lbs 9.6 oz    GENERAL:  Pleasant, cooperative, alert. WDWN.  Chills.   HEENT: Normocephalic, atraumatic.  Extra occular mm intact.  Sclera clear. PERRL.  Mucous membranes moist.    PULMONOLOGY: Clear to auscultation bilaterally anteriorly  CARDIAC: Regular   ABDOMEN: Soft, obese  MUSCULOSKELETAL:  Moving x 4 spontaneously with CMS intact x4.  Normal bulk and tone.  No LE edema.       NEURO: Alert and oriented x3.  CN II-XII grossly intact and symmetric.   Nonfocal exam.      Data   Results for orders placed or performed during the hospital encounter of 02/08/23 (from the past 24 hour(s))   Potassium   Result Value Ref Range    Potassium 3.4 3.4 - 5.3 mmol/L   ABO/Rh type and screen    Narrative    The following orders were created for panel order ABO/Rh type and screen.  Procedure                               Abnormality         Status                     ---------                               -----------         ------                     Adult Type and Screen[[id number]]                            Final result                 Please view results for these tests on the individual orders.   Hemoglobin   Result Value Ref Range    Hemoglobin 12.6 11.7 - 15.7 g/dL   Adult Type and Screen   Result Value Ref Range    ABO/RH(D) A POS     Antibody Screen Negative Negative    SPECIMEN EXPIRATION DATE [id number]

## 2023-02-09 NOTE — PROVIDER NOTIFICATION
MD Notification    Notified Person: MD    Notified Person Name: Dr. Penn    Notification Date/Time: 2124 2/8/23    Notification Interaction: Phone    Purpose of Notification: Zofran ineffective, can we order compazine?     Orders Received: IV or PO Compazine 10mg q6h    Comments:

## 2023-02-09 NOTE — ANESTHESIA POSTPROCEDURE EVALUATION
Patient: Radha Snyder    Procedure: Procedure(s):  ROBOTIC SUTURE RECTOPEXY       Anesthesia Type:  General    Note:  Disposition: Admission   Postop Pain Control: Uneventful            Sign Out: Well controlled pain   PONV: No   Neuro/Psych: Uneventful            Sign Out: Acceptable/Baseline neuro status   Airway/Respiratory: Uneventful            Sign Out: Acceptable/Baseline resp. status   CV/Hemodynamics: Uneventful            Sign Out: Acceptable CV status   Other NRE: NONE   DID A NON-ROUTINE EVENT OCCUR? No           Last vitals:  Vitals Value Taken Time   /63 02/08/23 1745   Temp 36.3  C (97.3  F) 02/08/23 1745   Pulse 73 02/08/23 1805   Resp 11 02/08/23 1805   SpO2 93 % 02/08/23 1805   Vitals shown include unvalidated device data.    Electronically Signed By: Teofilo Vasques MD  February 8, 2023  6:07 PM

## 2023-02-09 NOTE — PROGRESS NOTES
[facility]    Medicine Progress Note - Hospitalist Service  Consultation follow up - non billing    Date of Admission:  2/8/2023    Assessment & Plan   Radha Snyder is a 57 year old female with bilateral CVAs of uncertain cause (last documented CVA 2015) on DAPT, post-partum MI 2003, GERD, ulcerative colitis on mesalamine, HTN, DLD, migraines, and rectal prolapse who was admitted on 2/8/2023 for an elective robotic suture rectopexy with Dr Hawk.  Hospitalist consulted for post-op medical management.     S/p robotic suture rectopexy 2/8/23  Mgmt as per CRS.  Plavix on hold, resume as per CRS - plans for POD 5 per notes. Diet and pain control per primary.     Migraine headaches  Patient reports high concern regarding lack of food stating this can trigger migraine.  She also notes that she cannot have any artificial sugars as this will also trigger migraine.  - Avoid potential triggers  - PTA excedrin migraine and Butalbital-APAP-caff-cod reordered PRN migraine     HTN  - PTA lisinopril     Ulcerative Colitis  - PTA Mesalamine resumed by CRS     Recurrent ischemic CVAs of uncertain etiology  History of bilateral MCA strokes.  Last stroke in 7/2015 blamed on ibuprofen and obesity.  Etiology unclear.  Patient reports these are triggered by migraines, however, this is not supported by records.  Patient has undergone extensive work up across several hospital systems - including [facility] and [facility] - without obvious cause.  Most recent note from Neurologist Dr Jose Alfredo Garces ([facility]) from 2015 describes consider investigation into potential hypercoagulable disorder, vasculitis, and genetic mutation without obvious cause for strokes identified.  Weight optimization recommended and patient is being treated with indefinite aspirin, Plavix, and high-dose atorvastatin   - Resume PTA atorvastatin  - Resume PTA Plavix when cleared by CRS - as above, plan appears to be  "POD5     Nocturnal hypoxemia known diagnosis, HORACE suspected   - not on CPAP  - Monitor        Diet: Full Liquid Diet  Diet    DVT Prophylaxis: Defer to primary service  Briones Catheter: Not present  Lines: None     Cardiac Monitoring: None  Code Status: Full Code      Clinically Significant Risk Factors                        # Obesity: Estimated body mass index is 39.71 kg/m  as calculated from the following:    Height as of this encounter: 1.651 m (5' 5\").    Weight as of this encounter: 108.2 kg (238 lb 9.6 oz)., PRESENT ON ADMISSION         Disposition Plan      Expected Discharge Date: 02/10/2023, 12:00 PM                Cody Naidu MD  Hospitalist Service  [facility]  Securely message with [employer] (more info)  Text page via [employer] Paging/Directory   ______________________________________________________________________    Interval History   Chart reviewed. Discussed with RN this afternoon. No issues or concerns for hospitalist service. Will continue to chart check/follow peripherally.  Appears that primary team working towards possible discharge in next day or so.      Physical Exam   Vital Signs: Temp: 99.4  F (37.4  C) Temp src: Oral BP: 102/49 Pulse: 69   Resp: 16 SpO2: 92 % O2 Device: None (Room air) Oxygen Delivery: 2 LPM  Weight: 238 lbs 9.6 oz        Medical Decision Making             Data     "

## 2023-02-09 NOTE — PROGRESS NOTES
POD 1 from a robotic suture rectopexy. A&Ox4. CMS intact. Bowel sounds normoactive, tolerating full liquid diet. VSS. Lap sites secured with surgical glue. Up with SBA. C/o incisional pain, decreased with oxycodone. Briones out this AM, voided with low residual. IV fluids discontinued. Will continue to monitor. Discharge tomorrow.

## 2023-02-09 NOTE — PROGRESS NOTES
COLON & RECTAL SURGERY  PROGRESS NOTE    February 9, 2023  Post-op Day #1    SUBJECTIVE:  Has been up to side of bed. No n/v. Tolerating liquids. Pain controlled.     OBJECTIVE:  Temp:  [97.1  F (36.2  C)-99.4  F (37.4  C)] 99.4  F (37.4  C)  Pulse:  [63-95] 69  Resp:  [8-20] 16  BP: (102-132)/(49-80) 102/49  SpO2:  [91 %-99 %] 92 %    Intake/Output Summary (Last 24 hours) at 2/9/2023 0914  Last data filed at 2/9/2023 0510  Gross per 24 hour   Intake 1240 ml   Output 970 ml   Net 270 ml       GENERAL:  Awake, alert, no acute distress  HEAD: Normocephalic atraumatic  SCLERA: Anicteric  EXTREMITIES: Warm and well perfused  ABDOMEN:  Soft, appropriately tender, non-distended. No guarding, rigidity, or peritoneal signs.   INCISION:  C/d/i,     LABS:  Lab Results   Component Value Date    WBC 10.8 02/09/2023     Lab Results   Component Value Date    HGB 10.6 02/09/2023     Lab Results   Component Value Date    HCT 32.7 02/09/2023     Lab Results   Component Value Date     02/09/2023     Last Basic Metabolic Panel:  Lab Results   Component Value Date     02/09/2023      Lab Results   Component Value Date    POTASSIUM 3.6 02/09/2023     Lab Results   Component Value Date    CHLORIDE 109 02/09/2023     Lab Results   Component Value Date    FLAVIO 8.4 02/09/2023     Lab Results   Component Value Date    CO2 24 02/09/2023     Lab Results   Component Value Date    BUN 3.8 02/09/2023     Lab Results   Component Value Date    CR 0.64 02/09/2023     Lab Results   Component Value Date     02/09/2023     02/09/2023       ASSESSMENT/PLAN: 58 yo F with rectal prolapse s/p robotic suture rectopexy. AVSS.    1. Full liquid diet  2. PRN PO pain meds  3. Discontinue IVF  4. Remove Briones  5. OOB, ambulate  6. Plan to restart Plavix and ASA Pod#5.     Discharge later today vs tomorrow. Discussed with Dr. Hawk.     For questions/paging, please contact the [facility] office at [phone].    China Rosario PA-C  Colorectal  Physician Assistant    [facility]  [address]  [place] [address]  T: [phone]  F: [phone]      [facility] Staff  I performed a history and physical examination of the patient and discussed their management with the physician assistant. I reviewed the physician assistants note and agree with the documented findings and plan of care.     Doing ok. Some nausea overnight. RLQ pain around port site. Good UOP.    Alert, NAD  abd soft, tender per incisions    Plan:   Fulls  TKO IVFs  Briones out today    Meliza Hawk MD, FACS    [facility]  [address]  [place] [address]  T: [phone]  F: [phone]

## 2023-02-09 NOTE — PLAN OF CARE
POD 1 from a Robotic Suture Rectopexy. A&Ox4. CMS intact. Bowel sounds hypoactive, - flatus, tolerating full liquid diet. Compazine given for nausea.  VSS on RA. Dressing DILAN to lap sites. Briones in place. Up with A1 GB. C/o abdominal pain, decreased with dil and tylenol. Pt scoring green on the Aggression Stop Light Tool. Plan ; continue to monitor. Discharge pending improvement.

## 2023-02-10 VITALS
WEIGHT: 238.6 LBS | HEIGHT: 65 IN | RESPIRATION RATE: 16 BRPM | SYSTOLIC BLOOD PRESSURE: 108 MMHG | BODY MASS INDEX: 39.75 KG/M2 | HEART RATE: 90 BPM | DIASTOLIC BLOOD PRESSURE: 61 MMHG | OXYGEN SATURATION: 96 % | TEMPERATURE: 98.8 F

## 2023-02-10 LAB — GLUCOSE BLDC GLUCOMTR-MCNC: 100 MG/DL (ref 70–99)

## 2023-02-10 PROCEDURE — 250N000013 HC RX MED GY IP 250 OP 250 PS 637: Performed by: COLON & RECTAL SURGERY

## 2023-02-10 PROCEDURE — 99207 PR NO CHARGE LOS: CPT | Performed by: HOSPITALIST

## 2023-02-10 PROCEDURE — 250N000011 HC RX IP 250 OP 636: Performed by: COLON & RECTAL SURGERY

## 2023-02-10 RX ORDER — OXYCODONE HYDROCHLORIDE 5 MG/1
5 TABLET ORAL EVERY 4 HOURS PRN
Qty: 15 TABLET | Refills: 0 | Status: SHIPPED | OUTPATIENT
Start: 2023-02-10

## 2023-02-10 RX ADMIN — ACETAMINOPHEN 975 MG: 325 TABLET, FILM COATED ORAL at 06:43

## 2023-02-10 RX ADMIN — OXYCODONE HYDROCHLORIDE 5 MG: 5 TABLET ORAL at 03:47

## 2023-02-10 RX ADMIN — ONDANSETRON 4 MG: 4 TABLET, ORALLY DISINTEGRATING ORAL at 04:50

## 2023-02-10 RX ADMIN — LISINOPRIL 10 MG: 10 TABLET ORAL at 08:27

## 2023-02-10 RX ADMIN — MESALAMINE 4.8 G: 1.2 TABLET, DELAYED RELEASE ORAL at 08:27

## 2023-02-10 RX ADMIN — OXYCODONE HYDROCHLORIDE 10 MG: 5 TABLET ORAL at 08:27

## 2023-02-10 ASSESSMENT — ACTIVITIES OF DAILY LIVING (ADL)
ADLS_ACUITY_SCORE: 22

## 2023-02-10 NOTE — PROGRESS NOTES
COLON & RECTAL SURGERY  PROGRESS NOTE    February 10, 2023  Post-op Day #2    SUBJECTIVE:  Pain controlled. Tolerating full liquids. No flatus yet, no Bm, no n/v. Ambulating.     OBJECTIVE:  Temp:  [98.8  F (37.1  C)-99.8  F (37.7  C)] 98.8  F (37.1  C)  Pulse:  [60-90] 90  Resp:  [16-18] 16  BP: (102-119)/(56-62) 108/61  SpO2:  [95 %-96 %] 96 %    Intake/Output Summary (Last 24 hours) at 2/10/2023 0912  Last data filed at 2/10/2023 0800  Gross per 24 hour   Intake 850 ml   Output 3150 ml   Net -2300 ml       GENERAL:  Awake, alert, no acute distress  HEAD: Normocephalic atraumatic  SCLERA: Anicteric  EXTREMITIES: Warm and well perfused  ABDOMEN:  Soft, appropriately tender, non-distended. No guarding, rigidity, or peritoneal signs.   INCISION:  C/d/i    LABS:  Lab Results   Component Value Date    WBC 10.8 02/09/2023     Lab Results   Component Value Date    HGB 10.6 02/09/2023     Lab Results   Component Value Date    HCT 32.7 02/09/2023     Lab Results   Component Value Date     02/09/2023     Last Basic Metabolic Panel:  Lab Results   Component Value Date     02/09/2023      Lab Results   Component Value Date    POTASSIUM 3.6 02/09/2023     Lab Results   Component Value Date    CHLORIDE 109 02/09/2023     Lab Results   Component Value Date    FLAVIO 8.4 02/09/2023     Lab Results   Component Value Date    CO2 24 02/09/2023     Lab Results   Component Value Date    BUN 3.8 02/09/2023     Lab Results   Component Value Date    CR 0.64 02/09/2023     Lab Results   Component Value Date     02/10/2023       ASSESSMENT/PLAN: 56 yo F with rectal prolapse s/p robotic suture rectopexy. AVSS.     1. Full liquid diet  2. PRN PO pain meds  3. Discontinue IVF  4. Remove Briones  5. OOB, ambulate  6. Plan to restart Plavix and ASA Pod#5.  7. Discharge today.     Discussed with Dr. Hawk.     For questions/paging, please contact the CRS office at [phone].    China Rosario PA-C  Colorectal Physician  Assistant    [facility]  [address]  [place] [address]  T: [phone]  F: [phone]

## 2023-02-10 NOTE — PROGRESS NOTES
Brief hospitalist follow up note  Non-billing      Chart reviewed.  Primary has discharge plans in place, DAPT to resume POD 5. Recommend PCP follow up as needed.      Discussed with RN prior to discharge - no concerns for hospitalist.

## 2023-02-10 NOTE — PLAN OF CARE
Goal Outcome Evaluation:    Orientation: A+Ox4    Vitals/Tele: VSS, on RA    IV Access/drains: PIV, SL    Diet: Full liquids     Mobility: Independent in room     GI/: Continent     Wound/Skin: 6 lap sites with liquid bandage, intact     Discharge Plan: Possibly today     Other info: PRN tums giveb x1 for heart burn, PRN oxy given x2, and PRN Zofran x1. Pt still not passing gas.     See Flow sheets for assessment

## 2023-02-10 NOTE — PROVIDER NOTIFICATION
MD Notification    Notified Person: MD    Notified Person Name:  Paola Stapleton    Notification Date/Time: 2045    Notification Interaction: [employer] web    Purpose of Notification: Pt requesting tums for heartburn    Orders Received: medication ordered    Comments:

## 2023-02-10 NOTE — PROGRESS NOTES
Orientation/Cognitive: A/Ox4  Mobility Level/Assist Equipment: Ind  Fall Risk (Y/N): no  Behavior Concerns: none  Pain Management: pain in right upper region near lap sites, PRN oxy given 1x with relief  Tele/VS/O2: VSS on RA  ABNL Lab/BG: Hgb: 10.6  Diet: full liquid diet, no nausea reported  Bowel/Bladder: continent of B&B, voiding well  Skin Concerns: 6 abdominal lap sites CDI  Drains/Devices: Right PIV SL  Anticipated DC date & active delays: Possible discharge 2/10

## 2023-02-10 NOTE — PLAN OF CARE
Goal Outcome Evaluation:      Plan of Care Reviewed With: patient    Overall Patient Progress: improvingOverall Patient Progress: improving       Alert+ Ox4,full liquid diet, positive bowel sound,denies nausea,  pt not able passing gas, Vital VSS in room air. Activity independent . Pain management Oxy every 4 hour. IV removed. Pt discharging home, all belonging at with pt and meds. Discharge educations given and pt received well.

## 2023-02-14 NOTE — DISCHARGE SUMMARY
[facility] Discharge Summary      Radha Snyder MRN# [id number]   Age: 57 year old YOB: 1965     Date of Admission:  2/8/2023  Date of Discharge::  2/10/2023  1:28 PM  Admitting Physician:  Maria Antonia Hawk MD  Discharge Physician:  Maria Antonia Hawk MD     PCP:  [facility], [facility]    Disposition: Patient discharged from [facility] to home in stable condition.        Primary Diagnosis:   Rectal prolapse        Discharge Medications:     Discharge Medication List as of 2/10/2023 12:39 PM      START taking these medications    Details   oxyCODONE (ROXICODONE) 5 MG tablet Take 1 tablet (5 mg) by mouth every 4 hours as needed for moderate pain (4-6), Disp-15 tablet, R-0, E-Prescribe         CONTINUE these medications which have NOT CHANGED    Details   aspirin 81 MG EC tablet Take 81 mg by mouth daily, Historical      aspirin-acetaminophen-caffeine (EXCEDRIN MIGRAINE) 250-250-65 mg per tablet [ASPIRIN-ACETAMINOPHEN-CAFFEINE (EXCEDRIN MIGRAINE) 250-250-65 MG PER TABLET] Take 1 tablet by mouth every 6 (six) hours as needed for pain., Historical      atorvastatin (LIPITOR) 80 MG tablet [ATORVASTATIN (LIPITOR) 80 MG TABLET] Take 80 mg by mouth bedtime., Historical      butalbital-acetaminop-caf-cod -37-30 mg cap [BUTALBITAL-ACETAMINOP-CAF-COD -22-30 MG CAP] Take 1 capsule by mouth every 6 (six) hours as needed. , Historical      cinnamon bark 500 mg capsule [CINNAMON BARK 500 MG CAPSULE] Take 500 mg by mouth daily., Historical      clopidogrel (PLAVIX) 75 mg tablet [CLOPIDOGREL (PLAVIX) 75 MG TABLET] Take 75 mg by mouth daily., Historical      ginkgo biloba 40 mg Tab [GINKGO BILOBA 40 MG TAB] Take 40 mg by mouth daily. , Historical      glucosamine-chondroitin 500-400 mg cap [GLUCOSAMINE-CHONDROITIN 500-400 MG CAP] Take 1 capsule by mouth 3 (three) times a day., Historical      lisinopril (PRINIVIL,ZESTRIL) 10 MG tablet Take 10 mg by mouth  daily, Historical      mesalamine (LIALDA) 1.2 g DR tablet Take 4 tablets by mouth daily, Historical      VITAMIN B COMPLEX (B COMPLEX ORAL) [VITAMIN B COMPLEX (B COMPLEX ORAL)] Take 1 tablet by mouth daily. , Historical      VITAMIN D PO Take 1 tablet by mouth daily, Historical         STOP taking these medications       metroNIDAZOLE (FLAGYL) 500 MG tablet Comments:   Reason for Stopping:                      Follow Up, Special Instructions:     Discharge diet: Advance to a regular diet as tolerated   Discharge activity: Lifting restricted to 10 pounds   Discharge follow-up: Follow up with Dr. Hawk in 3-4 weeks   Wound care: May get incision wet in shower but do not soak or scrub              Procedures:     Procedure(s): Robotic suture rectopexy       No other procedures performed during this admission            Consultations:   hospitalist          Brief Hospital Summary:     Patient is a 57 year old female who underwent robotic suture rectopexy on 2/8/23 by Dr. Hawk.   There were no immediate complications during this procedure.    Please refer to the full operative summary for details.  The patient's hospital course was unremarkable.  Pain was controlled on oral pain regimen.  She was tolerating a full liquid diet.  Bowel function had returned prior to discharge.  She recovered as anticipated and experienced no post-operative complications.         Attestation:  I have reviewed today's vital signs, notes, medications, labs and imaging.    China Rosario PA-C  Colorectal Physician Assistant    [facility]  [address] [address]  T: [phone]  F: [phone]            ADDENDUM:  Length of stay: 2 days  Indicate Y or N for the following:  UTI  No  C diff  No  PNA  No  SSI No  DVT No  PE  No  CVA No  MI No  Enterocutaneous fistula  No  Peripheral nerve injury  No  Abscess (not adjacent to anastomosis)  No  Leak No    Treated with:   Antibiotics NO   Drain   NO   Reoperation  NO  Death within 30 days No  Reintubation  No  Reoperation  No   Procedure n/a

## 2023-08-02 ENCOUNTER — TRANSCRIBE ORDERS (OUTPATIENT)
Dept: GASTROENTEROLOGY | Facility: CLINIC | Age: 58
End: 2023-08-02
Payer: COMMERCIAL

## 2023-08-02 ENCOUNTER — TELEPHONE (OUTPATIENT)
Dept: GASTROENTEROLOGY | Facility: CLINIC | Age: 58
End: 2023-08-02
Payer: COMMERCIAL

## 2023-08-02 DIAGNOSIS — Z86.0100 HISTORY OF COLONIC POLYPS: Primary | ICD-10-CM

## 2023-08-02 NOTE — TELEPHONE ENCOUNTER
Procedure Scheduled: FLEXIBLE SIGMOIDOSCOPY [Flex Sig]  Procedure Date: 9/5/2023  Site:[facility]; [facility] [address] [place] [address]  Endoscopist: JOSE  Ordering Provider: JOSE  Scheduled by (per the direction of): FAX FROM [facility]          REMINDER: We do not accept [employer] insurance.

## 2023-09-05 ENCOUNTER — HOSPITAL ENCOUNTER (OUTPATIENT)
Facility: CLINIC | Age: 58
Discharge: HOME | End: 2023-09-05
Attending: COLON & RECTAL SURGERY | Admitting: COLON & RECTAL SURGERY
Payer: COMMERCIAL

## 2023-09-05 VITALS
WEIGHT: 240 LBS | TEMPERATURE: 97.5 F | HEIGHT: 65 IN | RESPIRATION RATE: 16 BRPM | SYSTOLIC BLOOD PRESSURE: 107 MMHG | BODY MASS INDEX: 39.99 KG/M2 | DIASTOLIC BLOOD PRESSURE: 66 MMHG | OXYGEN SATURATION: 100 % | HEART RATE: 60 BPM

## 2023-09-05 LAB — FLEXIBLE SIGMOIDOSCOPY: NORMAL

## 2023-09-05 PROCEDURE — 250N000011 HC RX IP 250 OP 636: Performed by: COLON & RECTAL SURGERY

## 2023-09-05 PROCEDURE — 999N000099 HC STATISTIC MODERATE SEDATION < 10 MIN: Performed by: COLON & RECTAL SURGERY

## 2023-09-05 PROCEDURE — 258N000003 HC RX IP 258 OP 636: Performed by: COLON & RECTAL SURGERY

## 2023-09-05 PROCEDURE — 45330 DIAGNOSTIC SIGMOIDOSCOPY: CPT | Performed by: COLON & RECTAL SURGERY

## 2023-09-05 RX ORDER — LIDOCAINE 40 MG/G
CREAM TOPICAL
Status: DISCONTINUED | OUTPATIENT
Start: 2023-09-05 | End: 2023-09-05 | Stop reason: HOSPADM

## 2023-09-05 RX ORDER — ATROPINE SULFATE 0.1 MG/ML
1 INJECTION INTRAVENOUS
Status: DISCONTINUED | OUTPATIENT
Start: 2023-09-05 | End: 2023-09-05 | Stop reason: HOSPADM

## 2023-09-05 RX ORDER — ONDANSETRON 4 MG/1
4 TABLET, ORALLY DISINTEGRATING ORAL EVERY 6 HOURS PRN
Status: CANCELLED | OUTPATIENT
Start: 2023-09-05

## 2023-09-05 RX ORDER — FLUMAZENIL 0.1 MG/ML
0.2 INJECTION, SOLUTION INTRAVENOUS
Status: DISCONTINUED | OUTPATIENT
Start: 2023-09-05 | End: 2023-09-05 | Stop reason: HOSPADM

## 2023-09-05 RX ORDER — DIPHENHYDRAMINE HYDROCHLORIDE 50 MG/ML
25-50 INJECTION INTRAMUSCULAR; INTRAVENOUS
Status: DISCONTINUED | OUTPATIENT
Start: 2023-09-05 | End: 2023-09-05 | Stop reason: HOSPADM

## 2023-09-05 RX ORDER — NALOXONE HYDROCHLORIDE 0.4 MG/ML
0.4 INJECTION, SOLUTION INTRAMUSCULAR; INTRAVENOUS; SUBCUTANEOUS
Status: DISCONTINUED | OUTPATIENT
Start: 2023-09-05 | End: 2023-09-05 | Stop reason: HOSPADM

## 2023-09-05 RX ORDER — FLUMAZENIL 0.1 MG/ML
0.2 INJECTION, SOLUTION INTRAVENOUS
Status: CANCELLED | OUTPATIENT
Start: 2023-09-05 | End: 2023-09-05

## 2023-09-05 RX ORDER — ONDANSETRON 2 MG/ML
4 INJECTION INTRAMUSCULAR; INTRAVENOUS EVERY 6 HOURS PRN
Status: CANCELLED | OUTPATIENT
Start: 2023-09-05

## 2023-09-05 RX ORDER — FENTANYL CITRATE 50 UG/ML
50-100 INJECTION, SOLUTION INTRAMUSCULAR; INTRAVENOUS EVERY 5 MIN PRN
Status: DISCONTINUED | OUTPATIENT
Start: 2023-09-05 | End: 2023-09-05 | Stop reason: HOSPADM

## 2023-09-05 RX ORDER — SIMETHICONE 40MG/0.6ML
133 SUSPENSION, DROPS(FINAL DOSAGE FORM)(ML) ORAL
Status: DISCONTINUED | OUTPATIENT
Start: 2023-09-05 | End: 2023-09-05 | Stop reason: HOSPADM

## 2023-09-05 RX ORDER — NALOXONE HYDROCHLORIDE 0.4 MG/ML
0.2 INJECTION, SOLUTION INTRAMUSCULAR; INTRAVENOUS; SUBCUTANEOUS
Status: DISCONTINUED | OUTPATIENT
Start: 2023-09-05 | End: 2023-09-05 | Stop reason: HOSPADM

## 2023-09-05 RX ORDER — ONDANSETRON 2 MG/ML
4 INJECTION INTRAMUSCULAR; INTRAVENOUS
Status: DISCONTINUED | OUTPATIENT
Start: 2023-09-05 | End: 2023-09-05 | Stop reason: HOSPADM

## 2023-09-05 RX ORDER — PROCHLORPERAZINE MALEATE 10 MG
10 TABLET ORAL EVERY 6 HOURS PRN
Status: CANCELLED | OUTPATIENT
Start: 2023-09-05

## 2023-09-05 RX ORDER — EPINEPHRINE 1 MG/ML
0.1 INJECTION, SOLUTION INTRAMUSCULAR; SUBCUTANEOUS
Status: DISCONTINUED | OUTPATIENT
Start: 2023-09-05 | End: 2023-09-05 | Stop reason: HOSPADM

## 2023-09-05 RX ADMIN — FENTANYL CITRATE 100 MCG: 50 INJECTION, SOLUTION INTRAMUSCULAR; INTRAVENOUS at 07:29

## 2023-09-05 RX ADMIN — MIDAZOLAM 1 MG: 1 INJECTION INTRAMUSCULAR; INTRAVENOUS at 07:29

## 2023-09-05 RX ADMIN — SODIUM CHLORIDE 500 ML: 9 INJECTION, SOLUTION INTRAVENOUS at 08:08

## 2023-09-05 ASSESSMENT — ACTIVITIES OF DAILY LIVING (ADL): ADLS_ACUITY_SCORE: 35

## 2023-09-05 NOTE — H&P
Pre-Endoscopy History and Physical     Radha Snyder MRN# [id number]   YOB: 1965 Age: 57 year old     Date of Procedure: 9/5/2023  Primary care provider: Ana Lilia Cruz  Type of Endoscopy: Flexible sigmoidoscopy  Reason for Procedure: F/U rectal lesion  Type of Anesthesia Anticipated: Moderate Sedation    HPI:    Radha is a 57 year old female who will be undergoing the above procedure.      A history and physical has been performed. The patient's medications and allergies have been reviewed. The risks and benefits of the procedure and the sedation options and risks were discussed with the patient.  All questions were answered and informed consent was obtained.      She denies a personal or family history of anesthesia complications or bleeding disorders.     Allergies   Allergen Reactions    Anaprox [Naproxen] Hives    Aspartame Nausea and Vomiting and Headache     Any artificial sweetener    Prednisone Other (See Comments)     Drastic weight gain    Sulfa (Sulfonamide Antibiotics) [Sulfa Antibiotics] Hives    Vicodin [Hydrocodone-Acetaminophen] Nausea     Severe nausea        No current facility-administered medications on file prior to encounter.  aspirin 81 MG EC tablet, Take 81 mg by mouth daily  aspirin-acetaminophen-caffeine (EXCEDRIN MIGRAINE) 250-250-65 mg per tablet, [ASPIRIN-ACETAMINOPHEN-CAFFEINE (EXCEDRIN MIGRAINE) 250-250-65 MG PER TABLET] Take 1 tablet by mouth every 6 (six) hours as needed for pain.  atorvastatin (LIPITOR) 80 MG tablet, [ATORVASTATIN (LIPITOR) 80 MG TABLET] Take 80 mg by mouth bedtime.  butalbital-acetaminop-caf-cod -62-30 mg cap, [BUTALBITAL-ACETAMINOP-CAF-COD -19-30 MG CAP] Take 1 capsule by mouth every 6 (six) hours as needed.   cinnamon bark 500 mg capsule, [CINNAMON BARK 500 MG CAPSULE] Take 500 mg by mouth daily.  clopidogrel (PLAVIX) 75 mg tablet, [CLOPIDOGREL (PLAVIX) 75 MG TABLET] Take 75 mg by mouth daily.  ginkgo biloba 40 mg Tab, [GINKGO BILOBA 40  MG TAB] Take 40 mg by mouth daily.   glucosamine-chondroitin 500-400 mg cap, [GLUCOSAMINE-CHONDROITIN 500-400 MG CAP] Take 1 capsule by mouth 3 (three) times a day.  lisinopril (PRINIVIL,ZESTRIL) 10 MG tablet, Take 10 mg by mouth daily  mesalamine (LIALDA) 1.2 g DR tablet, Take 4 tablets by mouth daily  oxyCODONE (ROXICODONE) 5 MG tablet, Take 1 tablet (5 mg) by mouth every 4 hours as needed for moderate pain (4-6)  VITAMIN B COMPLEX (B COMPLEX ORAL), [VITAMIN B COMPLEX (B COMPLEX ORAL)] Take 1 tablet by mouth daily.   VITAMIN D PO, Take 1 tablet by mouth daily        Patient Active Problem List   Diagnosis    Rectal prolapse        Past Medical History:   Diagnosis Date    Allergic rhinitis     Cerebral infarction due to thrombosis of cerebral artery (H)     Gastroesophageal reflux disease     HLD (hyperlipidemia)     Hypoxemia     Incomplete RBBB     Migraine     Morbid obesity (H)     Old myocardial infarction     Ulcerative colitis (H)         Past Surgical History:   Procedure Laterality Date    BLADDER SUSPENSION      COLONOSCOPY      COLONOSCOPY N/A 2/7/2023    Procedure: COLONOSCOPY, WITH biopsies;  Surgeon: Maria Antonia Hawk MD;  Location:  GI    CYSTOSCOPY N/A 05/11/2016    Procedure: CYSTOSCOPY;  Surgeon: Andres Sampson MD;  Location: [facility];  Service:     DAVINCI RECTOPEXY N/A 2/8/2023    Procedure: ROBOTIC SUTURE RECTOPEXY;  Surgeon: Maria Antonia Hawk MD;  Location: [facility] SLING OPERATION FOR STRESS INCONTINENCE N/A 05/11/2016    Procedure: MIDURETHRAL SLING (SPARC);  Surgeon: Andres Sampson MD;  Location: [facility];  Service: Urology    HYSTERECTOMY  01/06/2017    Anterior Repair    HYSTERECTOMY VAGINAL N/A 01/06/2017    Procedure: VAGINAL HYSTERECTOMY, ANTERIOR REPAIR;  Surgeon: Ivan Aceves MD;  Location: [facility];  Service:        Social History     Tobacco Use    Smoking status: Former     Types: Cigarettes     Quit date: 1/1/1999     Years  "since quittin.6    Smokeless tobacco: Never   Substance Use Topics    Alcohol use: Yes     Comment: Alcoholic Drinks/day: rare       Family History   Problem Relation Age of Onset    Colon Cancer No family hx of        REVIEW OF SYSTEMS:     5 point ROS negative except as noted above in HPI, including Gen., Resp., CV, GI &  system review.      PHYSICAL EXAM:   There were no vitals taken for this visit. Estimated body mass index is 39.71 kg/m  as calculated from the following:    Height as of 23: 1.651 m (5' 5\").    Weight as of 23: 108.2 kg (238 lb 9.6 oz).   GENERAL APPEARANCE: healthy and alert  MENTAL STATUS: alert  AIRWAY EXAM: Mallampatti Class I (visualization of the soft palate, fauces, uvula, anterior and posterior pillars)  RESP: lungs clear to auscultation - no rales, rhonchi or wheezes  CV: regular rates and rhythm      IMPRESSION   ASA Class 3 - Severe systemic disease, but not incapacitating        PLAN:     Plan for colonoscopy. We discussed the risks, benefits and alternatives and the patient wished to proceed.    The above has been forwarded to the consulting provider.      Meliza Hawk MD  [facility]  Phone: [phone]  Fax: [phone]  2023    "

## (undated) DEVICE — SU PROLENE 2-0 CT-1 30" 8423H

## (undated) DEVICE — GOWN LG DISP 9515

## (undated) DEVICE — SU VICRYL 3-0 SH 27" UND J416H

## (undated) DEVICE — DAVINCI XI FCP BIPOLAR FENESTRATED 470205

## (undated) DEVICE — DAVINCI XI DRAPE ARM 470015

## (undated) DEVICE — ENDO FORCEP ENDOJAW BIOPSY 2.8MMX230CM FB-220U

## (undated) DEVICE — Device

## (undated) DEVICE — SUCTION IRR STRYKERFLOW II W/TIP 250-070-520

## (undated) DEVICE — LINEN TOWEL PACK X5 5464

## (undated) DEVICE — ESU GROUND PAD UNIVERSAL W/O CORD

## (undated) DEVICE — KIT ENDO TURNOVER/PROCEDURE W/CLEAN A SCOPE LINERS 103888

## (undated) DEVICE — SYSTEM LAPAROVUE VISIBILITY LAPVUE10

## (undated) DEVICE — KIT PATIENT POSITIONING PIGAZZI LATEX FREE 40580

## (undated) DEVICE — SU MONOCRYL 4-0 PS-2 18" UND Y496G

## (undated) DEVICE — NDL INSUFFLATION 13GA 120MM C2201

## (undated) DEVICE — DAVINCI XI MONOPOLAR SCISSORS HOT SHEARS 8MM 470179

## (undated) DEVICE — DAVINCI XI DRAPE COLUMN 470341

## (undated) DEVICE — DAVINCI XI GRASPER FENESTRATED TIP UP 8MM 470347

## (undated) DEVICE — DAVINCI XI REDUCER 8-12MM 470381

## (undated) DEVICE — TUBING CONMED AIRSEAL SMOKE EVAC INSUFFLATION ASM-EVAC

## (undated) DEVICE — PACK DAVINCI GYN SMA15GDFS1

## (undated) DEVICE — SYR 10ML FINGER CONTROL W/O NDL 309695

## (undated) DEVICE — ENDO TROCAR CONMED AIRSEAL BLADELESS 05X120MM IAS5-120LP

## (undated) DEVICE — SOL WATER IRRIG 1000ML BOTTLE 2F7114

## (undated) DEVICE — SOL NACL 0.9% INJ 1000ML BAG 2B1324X

## (undated) DEVICE — DAVINCI XI NDL DRIVER LARGE 470006

## (undated) DEVICE — SUCTION CANISTER MEDIVAC LINER 3000ML W/LID 65651-530

## (undated) DEVICE — DRAPE BREAST/CHEST 29420

## (undated) DEVICE — DAVINCI XI SEAL UNIVERSAL 5-8MM 470361

## (undated) DEVICE — DAVINCI HOT SHEARS TIP COVER  400180

## (undated) DEVICE — SOL NACL 0.9% IRRIG 1000ML BOTTLE 2F7124

## (undated) DEVICE — DRAPE IOBAN INCISE 23X17" 6650EZ

## (undated) DEVICE — DAVINCI XI OBTURATOR BLADELESS 8MM 470359

## (undated) DEVICE — CATH TRAY FOLEY SURESTEP 16FR WDRAIN BAG STLK LATEX A300316A

## (undated) DEVICE — GLOVE BIOGEL PI MICRO INDICATOR UNDERGLOVE SZ 6.5 48965

## (undated) RX ORDER — METRONIDAZOLE 500 MG/100ML
INJECTION, SOLUTION INTRAVENOUS
Status: DISPENSED
Start: 2023-02-08

## (undated) RX ORDER — HYDROMORPHONE HCL IN WATER/PF 6 MG/30 ML
PATIENT CONTROLLED ANALGESIA SYRINGE INTRAVENOUS
Status: DISPENSED
Start: 2023-02-08

## (undated) RX ORDER — FENTANYL CITRATE 50 UG/ML
INJECTION, SOLUTION INTRAMUSCULAR; INTRAVENOUS
Status: DISPENSED
Start: 2023-02-08

## (undated) RX ORDER — ACETAMINOPHEN 325 MG/1
TABLET ORAL
Status: DISPENSED
Start: 2023-02-08

## (undated) RX ORDER — PROPOFOL 10 MG/ML
INJECTION, EMULSION INTRAVENOUS
Status: DISPENSED
Start: 2023-02-08

## (undated) RX ORDER — FENTANYL CITRATE 50 UG/ML
INJECTION, SOLUTION INTRAMUSCULAR; INTRAVENOUS
Status: DISPENSED
Start: 2023-09-05

## (undated) RX ORDER — ONDANSETRON 2 MG/ML
INJECTION INTRAMUSCULAR; INTRAVENOUS
Status: DISPENSED
Start: 2023-02-08

## (undated) RX ORDER — FENTANYL CITRATE 0.05 MG/ML
INJECTION, SOLUTION INTRAMUSCULAR; INTRAVENOUS
Status: DISPENSED
Start: 2023-02-08

## (undated) RX ORDER — BUPIVACAINE HYDROCHLORIDE 5 MG/ML
INJECTION, SOLUTION EPIDURAL; INTRACAUDAL
Status: DISPENSED
Start: 2023-02-08

## (undated) RX ORDER — FENTANYL CITRATE 50 UG/ML
INJECTION, SOLUTION INTRAMUSCULAR; INTRAVENOUS
Status: DISPENSED
Start: 2023-02-07

## (undated) RX ORDER — SIMETHICONE 40MG/0.6ML
SUSPENSION, DROPS(FINAL DOSAGE FORM)(ML) ORAL
Status: DISPENSED
Start: 2023-09-05